# Patient Record
Sex: MALE | Race: WHITE | NOT HISPANIC OR LATINO | Employment: OTHER | URBAN - METROPOLITAN AREA
[De-identification: names, ages, dates, MRNs, and addresses within clinical notes are randomized per-mention and may not be internally consistent; named-entity substitution may affect disease eponyms.]

---

## 2017-07-26 ENCOUNTER — GENERIC CONVERSION - ENCOUNTER (OUTPATIENT)
Dept: OTHER | Facility: OTHER | Age: 77
End: 2017-07-26

## 2017-07-27 ENCOUNTER — ALLSCRIPTS OFFICE VISIT (OUTPATIENT)
Dept: OTHER | Facility: OTHER | Age: 77
End: 2017-07-27

## 2017-07-27 LAB
BILIRUB UR QL STRIP: NEGATIVE
CLARITY UR: NORMAL
COLOR UR: YELLOW
GLUCOSE (HISTORICAL): NEGATIVE
HGB UR QL STRIP.AUTO: NEGATIVE
KETONES UR STRIP-MCNC: NEGATIVE MG/DL
LEUKOCYTE ESTERASE UR QL STRIP: NEGATIVE
NITRITE UR QL STRIP: NEGATIVE
PH UR STRIP.AUTO: 7 [PH]
PROT UR STRIP-MCNC: NEGATIVE MG/DL
SP GR UR STRIP.AUTO: 1.01
UROBILINOGEN UR QL STRIP.AUTO: 0.2

## 2017-08-17 ENCOUNTER — ALLSCRIPTS OFFICE VISIT (OUTPATIENT)
Dept: OTHER | Facility: OTHER | Age: 77
End: 2017-08-17

## 2017-08-17 LAB
BILIRUB UR QL STRIP: NORMAL
CLARITY UR: NORMAL
COLOR UR: NORMAL
GLUCOSE (HISTORICAL): NORMAL
HGB UR QL STRIP.AUTO: NORMAL
KETONES UR STRIP-MCNC: NORMAL MG/DL
LEUKOCYTE ESTERASE UR QL STRIP: NORMAL
NITRITE UR QL STRIP: NORMAL
PH UR STRIP.AUTO: 6.5 [PH]
PROT UR STRIP-MCNC: NORMAL MG/DL
SP GR UR STRIP.AUTO: 1.02
UROBILINOGEN UR QL STRIP.AUTO: 0.2

## 2017-08-22 ENCOUNTER — ALLSCRIPTS OFFICE VISIT (OUTPATIENT)
Dept: OTHER | Facility: OTHER | Age: 77
End: 2017-08-22

## 2017-08-22 LAB
BILIRUB UR QL STRIP: NEGATIVE
CLARITY UR: NORMAL
COLOR UR: YELLOW
GLUCOSE (HISTORICAL): NEGATIVE
HGB UR QL STRIP.AUTO: NORMAL
KETONES UR STRIP-MCNC: NEGATIVE MG/DL
LEUKOCYTE ESTERASE UR QL STRIP: NEGATIVE
NITRITE UR QL STRIP: NEGATIVE
PH UR STRIP.AUTO: 6.5 [PH]
PROT UR STRIP-MCNC: NEGATIVE MG/DL
SP GR UR STRIP.AUTO: 1.03
UROBILINOGEN UR QL STRIP.AUTO: 0.2

## 2018-01-09 NOTE — MISCELLANEOUS
Message   Recorded as Task   Date: 07/26/2017 01:01 PM, Created By: Haleigh Monk   Task Name: Miscellaneous   Assigned To: Dwight COYLE,TEAM   Regarding Patient: Jung Regalado, Status: Active   Comment:    Haleigh Monk - 26 Jul 2017 1:01 PM     TASK CREATED  Caller: Self; [phone] (Home)  Pt calling to make us aware he had his psa test done this morning at \[facility]\""  Emelyn Blackburn - 26 Jul 2017 1:53 PM     TASK EDITED  Noted          Signatures   Electronically signed by : Lupe Ridley RN; Jul 26 2017  1:53PM EST                       (Author)

## 2018-01-14 VITALS
WEIGHT: 220 LBS | BODY MASS INDEX: 29.8 KG/M2 | DIASTOLIC BLOOD PRESSURE: 76 MMHG | HEIGHT: 72 IN | SYSTOLIC BLOOD PRESSURE: 120 MMHG

## 2018-01-14 VITALS
SYSTOLIC BLOOD PRESSURE: 130 MMHG | BODY MASS INDEX: 29.39 KG/M2 | DIASTOLIC BLOOD PRESSURE: 76 MMHG | WEIGHT: 217 LBS | HEIGHT: 72 IN

## 2018-01-22 VITALS
WEIGHT: 223 LBS | HEIGHT: 72 IN | DIASTOLIC BLOOD PRESSURE: 60 MMHG | SYSTOLIC BLOOD PRESSURE: 100 MMHG | BODY MASS INDEX: 30.2 KG/M2

## 2018-07-27 ENCOUNTER — APPOINTMENT (OUTPATIENT)
Dept: LAB | Age: 78
End: 2018-07-27
Payer: MEDICARE

## 2018-07-27 DIAGNOSIS — Z12.5 ENCOUNTER FOR SCREENING FOR MALIGNANT NEOPLASM OF PROSTATE: ICD-10-CM

## 2018-07-27 LAB — PSA SERPL-MCNC: 0.8 NG/ML (ref 0–4)

## 2018-07-27 PROCEDURE — G0103 PSA SCREENING: HCPCS

## 2018-07-27 PROCEDURE — 36415 COLL VENOUS BLD VENIPUNCTURE: CPT

## 2018-07-27 RX ORDER — KETOCONAZOLE 20 MG/G
CREAM TOPICAL
Refills: 1 | COMMUNITY
Start: 2018-05-12

## 2018-07-27 RX ORDER — TAMSULOSIN HYDROCHLORIDE 0.4 MG/1
0.4 CAPSULE ORAL
Refills: 11 | COMMUNITY
Start: 2018-04-30 | End: 2018-07-31 | Stop reason: SDUPTHER

## 2018-07-27 RX ORDER — MOMETASONE FUROATE 1 MG/ML
SOLUTION TOPICAL
Refills: 3 | COMMUNITY
Start: 2018-05-14

## 2018-07-27 RX ORDER — NAPROXEN SODIUM 220 MG
220 TABLET ORAL
COMMUNITY

## 2018-07-31 ENCOUNTER — OFFICE VISIT (OUTPATIENT)
Dept: UROLOGY | Facility: MEDICAL CENTER | Age: 78
End: 2018-07-31
Payer: MEDICARE

## 2018-07-31 VITALS
BODY MASS INDEX: 29.4 KG/M2 | DIASTOLIC BLOOD PRESSURE: 60 MMHG | WEIGHT: 210 LBS | SYSTOLIC BLOOD PRESSURE: 110 MMHG | HEIGHT: 71 IN

## 2018-07-31 DIAGNOSIS — N13.8 BPH WITH OBSTRUCTION/LOWER URINARY TRACT SYMPTOMS: ICD-10-CM

## 2018-07-31 DIAGNOSIS — R35.1 NOCTURIA: Primary | ICD-10-CM

## 2018-07-31 DIAGNOSIS — N40.1 BPH WITH OBSTRUCTION/LOWER URINARY TRACT SYMPTOMS: ICD-10-CM

## 2018-07-31 DIAGNOSIS — Z12.5 ENCOUNTER FOR PROSTATE CANCER SCREENING: ICD-10-CM

## 2018-07-31 LAB — POST-VOID RESIDUAL VOLUME, ML POC: 56 ML

## 2018-07-31 PROCEDURE — 51798 US URINE CAPACITY MEASURE: CPT | Performed by: UROLOGY

## 2018-07-31 PROCEDURE — 99214 OFFICE O/P EST MOD 30 MIN: CPT | Performed by: UROLOGY

## 2018-07-31 RX ORDER — FINASTERIDE 1 MG/1
1 TABLET, FILM COATED ORAL
COMMUNITY
End: 2018-07-31 | Stop reason: CLARIF

## 2018-07-31 RX ORDER — TAMSULOSIN HYDROCHLORIDE 0.4 MG/1
0.4 CAPSULE ORAL
Qty: 90 CAPSULE | Refills: 3 | Status: SHIPPED | OUTPATIENT
Start: 2018-07-31 | End: 2019-08-06 | Stop reason: SDUPTHER

## 2018-07-31 NOTE — PROGRESS NOTES
[address] [facility]  [facility]  [address] [facility]  [place], [address] [facility]  [phone]  www  [facility]  org      NAME: Arabella Hillman  AGE: [de-identified] y o  SEX: male  : 1940   MRN: [id number]    DATE: 2018  TIME: 8:51 AM    Assessment and Plan:  BPH with obstruction:  He is actually doing quite well from my standpoint  He can continue to take the Flomax as needed  He is on no other medications from me  See me in 1 year for PSA and general recheck  Chief Complaint     Chief Complaint   Patient presents with    Benign Prostatic Hypertrophy     1 yr ck       History of Present Illness     Established patient with a history of BPH with obstruction, incomplete bladder emptying and nocturia and erectile dysfunction- stopped taking proscar 2 years ago- was having dry skin and read about some other sx, he decided to stop it  Diet remains a major factor in LUTS- coffee and chocolate  remains on flomax, which he takes only as needed if he feels that he has a problem  Cisneros Spruce PSA down to 0 76 last year, now 0 8  Cisneros Spruce proscar actually seemed to increase his nocturia to 4x/night, so it was stopped  I   In the past, I  placed him on imipramine and it had a paradoxical effect in terms of weakened stream, more urgency and frequency  Bladder scan 56 cc this morning  The following portions of the patient's history were reviewed and updated as appropriate: allergies, current medications, past family history, past medical history, past social history, past surgical history and problem list     Review of Systems   Review of Systems   Genitourinary: Negative  Active Problem List   There is no problem list on file for this patient  Objective   /60   Ht 5' 11" (1 803 m)   Wt 95 3 kg (210 lb)   BMI 29 29 kg/m²     Physical Exam   Constitutional: He is oriented to person, place, and time  He appears well-developed and well-nourished     HENT:   Head: Normocephalic and atraumatic  Eyes: EOM are normal    Neck: Normal range of motion  Pulmonary/Chest: Effort normal    Genitourinary: Rectum normal and prostate normal    Genitourinary Comments: Rectal exam reveals normal tone no masses and his prostate is about 30 g, smooth symmetric and benign  Musculoskeletal: Normal range of motion  Neurological: He is alert and oriented to person, place, and time  Skin: Skin is warm and dry  Psychiatric: He has a normal mood and affect   His behavior is normal  Judgment and thought content normal            Current Medications     Current Outpatient Prescriptions:     finasteride (PROPECIA) 1 MG tablet, Take 1 mg by mouth, Disp: , Rfl:     ketoconazole (NIZORAL) 2 % cream, APPLY TO AFFECTED AREA(S) TWO TIMES DAILY, Disp: , Rfl: 1    mometasone (ELOCON) 0 1 % lotion, APPLY TO SCALP DAILY AS DIRECTED, Disp: , Rfl: 3    tamsulosin (FLOMAX) 0 4 mg, Take 0 4 mg by mouth daily at bedtime, Disp: , Rfl: 11    naproxen sodium (ALEVE) 220 MG tablet, Take 220 mg by mouth, Disp: , Rfl:         Tony Farah MD

## 2018-07-31 NOTE — LETTER
2018     [address] [address]    Patient: Ratna Deleon   YOB: 1940   Date of Visit: 2018       Dear Dr Mack Prieto: Thank you for referring Vic Mullen to me for evaluation  Below are my notes for this consultation  If you have questions, please do not hesitate to call me  I look forward to following your patient along with you  Sincerely,        Rob Bergeron MD        CC: No Recipients  Rob Bergeron MD  2018  9:06 AM  Sign at close encounter  [address] [facility]  [address], [address] [place] [phone]  www  [facility]  org      NAME: Ratna Deleon  AGE: [de-identified] y o  SEX: male  : 1940   MRN: [id number]    DATE: 2018  TIME: 8:51 AM    Assessment and Plan:  BPH with obstruction:  He is actually doing quite well from my standpoint  He can continue to take the Flomax as needed  He is on no other medications from me  See me in 1 year for PSA and general recheck  Chief Complaint     Chief Complaint   Patient presents with    Benign Prostatic Hypertrophy     1 yr ck       History of Present Illness     Established patient with a history of BPH with obstruction, incomplete bladder emptying and nocturia and erectile dysfunction- stopped taking proscar 2 years ago- was having dry skin and read about some other sx, he decided to stop it  Diet remains a major factor in LUTS- coffee and chocolate  remains on flomax, which he takes only as needed if he feels that he has a problem  Dania Hodgson PSA down to 0 76 last year, now 0 8  Dania Hodgson proscar actually seemed to increase his nocturia to 4x/night, so it was stopped  I   In the past, I  placed him on imipramine and it had a paradoxical effect in terms of weakened stream, more urgency and frequency  Bladder scan 56 cc this morning            The following portions of the patient's history were reviewed and updated as appropriate: allergies, current medications, past family history, past medical history, past social history, past surgical history and problem list     Review of Systems   Review of Systems   Genitourinary: Negative  Active Problem List   There is no problem list on file for this patient  Objective   /60   Ht 5' 11" (1 803 m)   Wt 95 3 kg (210 lb)   BMI 29 29 kg/m²      Physical Exam   Constitutional: He is oriented to person, place, and time  He appears well-developed and well-nourished  HENT:   Head: Normocephalic and atraumatic  Eyes: EOM are normal    Neck: Normal range of motion  Pulmonary/Chest: Effort normal    Genitourinary: Rectum normal and prostate normal    Genitourinary Comments: Rectal exam reveals normal tone no masses and his prostate is about 30 g, smooth symmetric and benign  Musculoskeletal: Normal range of motion  Neurological: He is alert and oriented to person, place, and time  Skin: Skin is warm and dry  Psychiatric: He has a normal mood and affect   His behavior is normal  Judgment and thought content normal            Current Medications     Current Outpatient Prescriptions:     finasteride (PROPECIA) 1 MG tablet, Take 1 mg by mouth, Disp: , Rfl:     ketoconazole (NIZORAL) 2 % cream, APPLY TO AFFECTED AREA(S) TWO TIMES DAILY, Disp: , Rfl: 1    mometasone (ELOCON) 0 1 % lotion, APPLY TO SCALP DAILY AS DIRECTED, Disp: , Rfl: 3    tamsulosin (FLOMAX) 0 4 mg, Take 0 4 mg by mouth daily at bedtime, Disp: , Rfl: 11    naproxen sodium (ALEVE) 220 MG tablet, Take 220 mg by mouth, Disp: , Rfl:         Jerson Farias MD

## 2019-07-30 ENCOUNTER — APPOINTMENT (OUTPATIENT)
Dept: LAB | Age: 79
End: 2019-07-30
Payer: MEDICARE

## 2019-07-30 DIAGNOSIS — Z12.5 ENCOUNTER FOR PROSTATE CANCER SCREENING: ICD-10-CM

## 2019-07-30 LAB — PSA SERPL-MCNC: 1.4 NG/ML (ref 0–4)

## 2019-07-30 PROCEDURE — G0103 PSA SCREENING: HCPCS

## 2019-07-30 PROCEDURE — 36415 COLL VENOUS BLD VENIPUNCTURE: CPT

## 2019-07-31 RX ORDER — CELECOXIB 100 MG/1
100 CAPSULE ORAL 2 TIMES DAILY
Refills: 2 | COMMUNITY
Start: 2019-05-14

## 2019-08-06 ENCOUNTER — OFFICE VISIT (OUTPATIENT)
Dept: UROLOGY | Facility: MEDICAL CENTER | Age: 79
End: 2019-08-06
Payer: MEDICARE

## 2019-08-06 VITALS
HEIGHT: 71 IN | HEART RATE: 60 BPM | WEIGHT: 218 LBS | SYSTOLIC BLOOD PRESSURE: 120 MMHG | DIASTOLIC BLOOD PRESSURE: 70 MMHG | BODY MASS INDEX: 30.52 KG/M2

## 2019-08-06 DIAGNOSIS — R35.1 NOCTURIA: ICD-10-CM

## 2019-08-06 DIAGNOSIS — Z12.5 ENCOUNTER FOR PROSTATE CANCER SCREENING: Primary | ICD-10-CM

## 2019-08-06 PROCEDURE — 99214 OFFICE O/P EST MOD 30 MIN: CPT | Performed by: UROLOGY

## 2019-08-06 RX ORDER — TAMSULOSIN HYDROCHLORIDE 0.4 MG/1
0.4 CAPSULE ORAL
Qty: 90 CAPSULE | Refills: 3 | Status: SHIPPED | OUTPATIENT
Start: 2019-08-06 | End: 2021-08-05

## 2019-08-06 NOTE — PROGRESS NOTES
[address] [facility]  [facility]  [address] [facility]  [place], [address] [facility]  [phone]  www  [facility]  org      NAME: Rosa Tim  AGE: 78 y o  SEX: male  : 1940   MRN: [id number]    DATE: 2019  TIME: 2:14 PM    Assessment and Plan:    BPH with obstruction, with an increase in his nocturia-this may be related to his iced tea consumption at dinnertime and/ or his recent hip surgery  It also may be the Proscar slowly wearing off  At this time he is going to stop drinking iced tea at dinnertime and see how that goes  If that does not work, he will take his Flomax every night before bedtime  If that should fail, we can increase his Flomax to 0 8 mg upon his request   Refilled Flomax  Follow-up 1 year with PSA  Chief Complaint   No chief complaint on file  History of Present Illness   Yearly follow-up for BPH with obstruction  Also encounter for prostate cancer screening  Remains on Flomax but he does not take this consistently, stopped Proscar 3 years ago  Has history of nocturia and I had once placed him on imipramine and had a paradoxical effect in terms of weakening stream and giving him more urgency and frequency  PSA is 1 4 this year as of 2019  It was 0 8 last year  This may be wearing off of the Proscar  He has had an increase in his nocturia for the last month or 2    He is now getting up 3-4 times per night  He drinks iced tea at dinnertime  He had hip surgery-hip replacement about a month and half ago and he has not feel this affected his voiding at all  Stream is good        The following portions of the patient's history were reviewed and updated as appropriate: allergies, current medications, past family history, past medical history, past social history, past surgical history and problem list   Past Medical History:   Diagnosis Date    Acute prostatitis     Arthritis     BPH with obstruction/lower urinary tract symptoms     Cervicalgia     Dysuria     Erectile dysfunction     Feeling of incomplete bladder emptying     Frequency of urination     Nocturia     Urinary retention      Past Surgical History:   Procedure Laterality Date    HERNIA REPAIR  1998    HIP SURGERY  06/2019     shoulder  Review of Systems   Review of Systems   Gastrointestinal: Negative  Genitourinary: Positive for frequency  Negative for difficulty urinating  Musculoskeletal: Positive for arthralgias  Active Problem List   There is no problem list on file for this patient  Objective   /70   Pulse 60   Ht 5' 11" (1 803 m)   Wt 98 9 kg (218 lb)   BMI 30 40 kg/m²     Physical Exam   Constitutional: He is oriented to person, place, and time  He appears well-developed and well-nourished  HENT:   Head: Normocephalic and atraumatic  Eyes: EOM are normal    Neck: Normal range of motion  Cardiovascular: Normal rate  Pulmonary/Chest: Effort normal    Musculoskeletal: Normal range of motion  Neurological: He is alert and oriented to person, place, and time  Skin: Skin is warm and dry  Psychiatric: He has a normal mood and affect   His behavior is normal  Judgment and thought content normal            Current Medications     Current Outpatient Medications:     naproxen sodium (ALEVE) 220 MG tablet, Take 220 mg by mouth, Disp: , Rfl:     tamsulosin (FLOMAX) 0 4 mg, Take 1 capsule (0 4 mg total) by mouth daily at bedtime (Patient taking differently: Take 0 4 mg by mouth daily at bedtime ), Disp: 90 capsule, Rfl: 3    celecoxib (CeleBREX) 100 mg capsule, Take 100 mg by mouth 2 (two) times a day, Disp: , Rfl: 2    ketoconazole (NIZORAL) 2 % cream, APPLY TO AFFECTED AREA(S) TWO TIMES DAILY, Disp: , Rfl: 1    mometasone (ELOCON) 0 1 % lotion, APPLY TO SCALP DAILY AS DIRECTED, Disp: , Rfl: 3        Tony Farah MD

## 2019-08-06 NOTE — PATIENT INSTRUCTIONS
Try stopping the iced tea at dinnertime  If that does not make things better, take the tamsulosin every night before bedtime  You can also take 2 capsules of tamsulosin at bedtime if taking 1 capsule does not work

## 2021-08-05 ENCOUNTER — OFFICE VISIT (OUTPATIENT)
Dept: UROLOGY | Facility: MEDICAL CENTER | Age: 81
End: 2021-08-05
Payer: MEDICARE

## 2021-08-05 VITALS
DIASTOLIC BLOOD PRESSURE: 72 MMHG | BODY MASS INDEX: 26.6 KG/M2 | HEART RATE: 43 BPM | HEIGHT: 71 IN | SYSTOLIC BLOOD PRESSURE: 128 MMHG | WEIGHT: 190 LBS

## 2021-08-05 DIAGNOSIS — N13.8 BPH WITH OBSTRUCTION/LOWER URINARY TRACT SYMPTOMS: ICD-10-CM

## 2021-08-05 DIAGNOSIS — R35.0 URINARY FREQUENCY: Primary | ICD-10-CM

## 2021-08-05 DIAGNOSIS — R35.1 NOCTURIA: ICD-10-CM

## 2021-08-05 DIAGNOSIS — N40.0 BPH WITH ELEVATED PSA: ICD-10-CM

## 2021-08-05 DIAGNOSIS — R97.20 BPH WITH ELEVATED PSA: ICD-10-CM

## 2021-08-05 DIAGNOSIS — N40.1 BPH WITH OBSTRUCTION/LOWER URINARY TRACT SYMPTOMS: ICD-10-CM

## 2021-08-05 LAB
SL AMB  POCT GLUCOSE, UA: NORMAL
SL AMB LEUKOCYTE ESTERASE,UA: NORMAL
SL AMB POCT BILIRUBIN,UA: NORMAL
SL AMB POCT BLOOD,UA: NORMAL
SL AMB POCT CLARITY,UA: CLEAR
SL AMB POCT COLOR,UA: YELLOW
SL AMB POCT KETONES,UA: NORMAL
SL AMB POCT NITRITE,UA: NORMAL
SL AMB POCT PH,UA: 7
SL AMB POCT SPECIFIC GRAVITY,UA: 1.02
SL AMB POCT URINE PROTEIN: NORMAL
SL AMB POCT UROBILINOGEN: 0.2

## 2021-08-05 PROCEDURE — 81003 URINALYSIS AUTO W/O SCOPE: CPT | Performed by: UROLOGY

## 2021-08-05 PROCEDURE — 99214 OFFICE O/P EST MOD 30 MIN: CPT | Performed by: UROLOGY

## 2021-08-05 RX ORDER — CHOLECALCIFEROL (VITAMIN D3) 125 MCG
500 CAPSULE ORAL DAILY
COMMUNITY

## 2021-08-05 RX ORDER — DONEPEZIL HYDROCHLORIDE 5 MG/1
5 TABLET, FILM COATED ORAL EVERY EVENING
COMMUNITY
Start: 2021-04-29

## 2021-08-05 NOTE — ASSESSMENT & PLAN NOTE
This is the patient's primary complaint  Although he has BPH on physical exam, his symptoms could well be due to bladder malfunction  Cystoscopy scheduled

## 2021-08-05 NOTE — ASSESSMENT & PLAN NOTE
Some aspects of the patient's symptom complex was seem to be due to bladder malfunction rather than BPH  Cystoscopy scheduled

## 2021-08-05 NOTE — PROGRESS NOTES
Assessment/Plan:    Urinary frequency  This is the patient's primary complaint  Although he has BPH on physical exam, his symptoms could well be due to bladder malfunction  Cystoscopy scheduled  BPH with obstruction/lower urinary tract symptoms  Some aspects of the patient's symptom complex was seem to be due to bladder malfunction rather than BPH  Cystoscopy scheduled  Diagnoses and all orders for this visit:    Urinary frequency    Nocturia  -     POCT urine dip auto non-scope    BPH with elevated PSA    BPH with obstruction/lower urinary tract symptoms    Other orders  -     donepezil (ARICEPT) 5 mg tablet; Take 5 mg by mouth every evening  -     Cholecalciferol (Vitamin D3) 50 MCG (2000 UT) CHEW; Chew  -     vitamin B-12 (VITAMIN B-12) 500 mcg tablet; Take 500 mcg by mouth daily          Subjective:      Patient ID: Rosa Tim is a 78 y o  male  HPI  BPH:  He notes weak stream and nocturia x 2  He denies other significant urinary symptoms  He denies gross hematuria, urinary tract infections or incontinence  He is taking neither medications not supplements for his symptoms  Pt has frequency hourly  Pt has to  the BR in any new location  Does not carry a urinal in the car  Sx for a long time  Pt and wife cannot say how long  Tamsulosin made him void more often and he stopped it  PVR:  104 cc today, 56 cc on July 31, 2018  Never had a cysto  PSA:  No longer monitored due to the patient's age  0   Lab Value Date/Time    PSA 1 4 07/30/2019 1138    PSA 0 8 07/27/2018 1315   ]    AUA SYMPTOM SCORE      Most Recent Value   AUA SYMPTOM SCORE   How often have you had a sensation of not emptying your bladder completely after you finished urinating? 1   How often have you had to urinate again less than two hours after you finished urinating?   1   How often have you found you stopped and started again several times when you urinate?  0   How often have you found it difficult to postpone urination? 2   How often have you had a weak urinary stream?  2   How often have you had to push or strain to begin urination? 1   How many times did you most typically get up to urinate from the time you went to bed at night until the time you got up in the morning? 2   Quality of Life: If you were to spend the rest of your life with your urinary condition just the way it is now, how would you feel about that?  4   AUA SYMPTOM SCORE  9          The following portions of the patient's history were reviewed and updated as appropriate: allergies, current medications, past family history, past medical history, past social history, past surgical history and problem list     Review of Systems   Constitutional: Negative for activity change and fatigue  Respiratory: Negative for shortness of breath and wheezing  Cardiovascular: Negative for chest pain  Gastrointestinal: Negative for abdominal pain  Genitourinary: Negative for difficulty urinating, dysuria, frequency, hematuria and urgency  Musculoskeletal: Negative for back pain and gait problem  Skin: Negative  Allergic/Immunologic: Negative  Neurological: Negative  Declining mental status on donepezil  Note the patient has a PhD degree in psychology  Psychiatric/Behavioral: Negative  Objective:      /72   Pulse (!) 43   Ht 5' 11" (1 803 m)   Wt 86 2 kg (190 lb)   BMI 26 50 kg/m²          Physical Exam  Constitutional:       Appearance: He is well-developed  HENT:      Head: Normocephalic and atraumatic  Pulmonary:      Effort: Pulmonary effort is normal    Genitourinary:     Rectum: Normal       Comments: The prostate is 40 grams, firm, smooth and non-tender  Musculoskeletal:         General: Normal range of motion  Cervical back: Normal range of motion and neck supple  Skin:     General: Skin is warm and dry     Neurological:      Mental Status: He is alert and oriented to person, place, and time  Psychiatric:         Behavior: Behavior normal          Thought Content:  Thought content normal          Judgment: Judgment normal

## 2021-10-26 ENCOUNTER — PROCEDURE VISIT (OUTPATIENT)
Dept: UROLOGY | Facility: MEDICAL CENTER | Age: 81
End: 2021-10-26
Payer: MEDICARE

## 2021-10-26 VITALS
WEIGHT: 190 LBS | HEIGHT: 71 IN | DIASTOLIC BLOOD PRESSURE: 80 MMHG | BODY MASS INDEX: 26.6 KG/M2 | HEART RATE: 67 BPM | SYSTOLIC BLOOD PRESSURE: 124 MMHG

## 2021-10-26 DIAGNOSIS — Z71.89 OTHER SPECIFIED COUNSELING: ICD-10-CM

## 2021-10-26 DIAGNOSIS — N40.1 BPH WITH OBSTRUCTION/LOWER URINARY TRACT SYMPTOMS: Primary | ICD-10-CM

## 2021-10-26 DIAGNOSIS — N13.8 BPH WITH OBSTRUCTION/LOWER URINARY TRACT SYMPTOMS: Primary | ICD-10-CM

## 2021-10-26 PROCEDURE — 99213 OFFICE O/P EST LOW 20 MIN: CPT | Performed by: UROLOGY

## 2021-10-26 PROCEDURE — 52000 CYSTOURETHROSCOPY: CPT | Performed by: UROLOGY

## 2021-10-26 RX ORDER — ALFUZOSIN HYDROCHLORIDE 10 MG/1
10 TABLET, EXTENDED RELEASE ORAL DAILY
Qty: 30 TABLET | Refills: 3 | Status: SHIPPED | OUTPATIENT
Start: 2021-10-26 | End: 2022-02-24 | Stop reason: SDUPTHER

## 2021-10-26 RX ORDER — CLOTRIMAZOLE 1 %
CREAM (GRAM) TOPICAL
COMMUNITY

## 2022-02-24 DIAGNOSIS — N40.1 BPH WITH OBSTRUCTION/LOWER URINARY TRACT SYMPTOMS: ICD-10-CM

## 2022-02-24 DIAGNOSIS — N13.8 BPH WITH OBSTRUCTION/LOWER URINARY TRACT SYMPTOMS: ICD-10-CM

## 2022-02-24 RX ORDER — ALFUZOSIN HYDROCHLORIDE 10 MG/1
10 TABLET, EXTENDED RELEASE ORAL DAILY
Qty: 30 TABLET | Refills: 0 | Status: SHIPPED | OUTPATIENT
Start: 2022-02-24 | End: 2022-03-24

## 2022-03-24 DIAGNOSIS — N40.1 BPH WITH OBSTRUCTION/LOWER URINARY TRACT SYMPTOMS: ICD-10-CM

## 2022-03-24 DIAGNOSIS — N13.8 BPH WITH OBSTRUCTION/LOWER URINARY TRACT SYMPTOMS: ICD-10-CM

## 2022-03-24 RX ORDER — ALFUZOSIN HYDROCHLORIDE 10 MG/1
TABLET, EXTENDED RELEASE ORAL
Qty: 30 TABLET | Refills: 0 | Status: SHIPPED | OUTPATIENT
Start: 2022-03-24 | End: 2022-03-25

## 2022-03-25 RX ORDER — ALFUZOSIN HYDROCHLORIDE 10 MG/1
TABLET, EXTENDED RELEASE ORAL
Qty: 30 TABLET | Refills: 0 | Status: SHIPPED | OUTPATIENT
Start: 2022-03-25 | End: 2022-04-25

## 2022-04-23 DIAGNOSIS — N40.1 BPH WITH OBSTRUCTION/LOWER URINARY TRACT SYMPTOMS: ICD-10-CM

## 2022-04-23 DIAGNOSIS — N13.8 BPH WITH OBSTRUCTION/LOWER URINARY TRACT SYMPTOMS: ICD-10-CM

## 2022-04-25 RX ORDER — ALFUZOSIN HYDROCHLORIDE 10 MG/1
TABLET, EXTENDED RELEASE ORAL
Qty: 30 TABLET | Refills: 0 | Status: SHIPPED | OUTPATIENT
Start: 2022-04-25 | End: 2022-05-26

## 2022-05-26 DIAGNOSIS — N13.8 BPH WITH OBSTRUCTION/LOWER URINARY TRACT SYMPTOMS: ICD-10-CM

## 2022-05-26 DIAGNOSIS — N40.1 BPH WITH OBSTRUCTION/LOWER URINARY TRACT SYMPTOMS: ICD-10-CM

## 2022-05-26 RX ORDER — ALFUZOSIN HYDROCHLORIDE 10 MG/1
TABLET, EXTENDED RELEASE ORAL
Qty: 30 TABLET | Refills: 0 | Status: SHIPPED | OUTPATIENT
Start: 2022-05-26 | End: 2022-07-29 | Stop reason: SDUPTHER

## 2022-07-29 DIAGNOSIS — N13.8 BPH WITH OBSTRUCTION/LOWER URINARY TRACT SYMPTOMS: ICD-10-CM

## 2022-07-29 DIAGNOSIS — N40.1 BPH WITH OBSTRUCTION/LOWER URINARY TRACT SYMPTOMS: ICD-10-CM

## 2022-07-29 RX ORDER — ALFUZOSIN HYDROCHLORIDE 10 MG/1
10 TABLET, EXTENDED RELEASE ORAL DAILY
Qty: 90 TABLET | Refills: 1 | Status: SHIPPED | OUTPATIENT
Start: 2022-07-29

## 2022-07-29 NOTE — TELEPHONE ENCOUNTER
The patient has an upcoming office visit scheduled for 10/14/22 with Dr Natalia Philip in the [facility] location but will run out of medication until then    Request for same, 90 day supply with 1 refill was queued and forwarded to the Advanced Practitioner covering the [facility] location for approval

## 2022-07-29 NOTE — TELEPHONE ENCOUNTER
Patient is calling to request a prescription refill    Medication: Alfuzosin 10 mg    30 / 90 day supply: 30 day    Pharmacy: [facility]    Patient's wife requesting a call back when the medication is refilled at [phone]

## 2022-10-14 ENCOUNTER — OFFICE VISIT (OUTPATIENT)
Dept: UROLOGY | Facility: MEDICAL CENTER | Age: 82
End: 2022-10-14
Payer: MEDICARE

## 2022-10-14 ENCOUNTER — TELEPHONE (OUTPATIENT)
Dept: UROLOGY | Facility: AMBULATORY SURGERY CENTER | Age: 82
End: 2022-10-14

## 2022-10-14 VITALS
OXYGEN SATURATION: 99 % | DIASTOLIC BLOOD PRESSURE: 78 MMHG | BODY MASS INDEX: 26.5 KG/M2 | HEIGHT: 71 IN | SYSTOLIC BLOOD PRESSURE: 132 MMHG | HEART RATE: 71 BPM

## 2022-10-14 DIAGNOSIS — N40.1 BPH WITH OBSTRUCTION/LOWER URINARY TRACT SYMPTOMS: Primary | ICD-10-CM

## 2022-10-14 DIAGNOSIS — N39.41 URGE INCONTINENCE OF URINE: ICD-10-CM

## 2022-10-14 DIAGNOSIS — N13.8 BPH WITH OBSTRUCTION/LOWER URINARY TRACT SYMPTOMS: Primary | ICD-10-CM

## 2022-10-14 DIAGNOSIS — R41.89 COGNITIVE DECLINE: ICD-10-CM

## 2022-10-14 LAB — POST-VOID RESIDUAL VOLUME, ML POC: 14 ML

## 2022-10-14 PROCEDURE — 99214 OFFICE O/P EST MOD 30 MIN: CPT | Performed by: UROLOGY

## 2022-10-14 PROCEDURE — 51798 US URINE CAPACITY MEASURE: CPT | Performed by: UROLOGY

## 2022-10-14 RX ORDER — TROSPIUM CHLORIDE ER 60 MG/1
60 CAPSULE ORAL
Qty: 90 CAPSULE | Refills: 1 | Status: SHIPPED | OUTPATIENT
Start: 2022-10-14

## 2022-10-14 NOTE — TELEPHONE ENCOUNTER
I spoke with patients daughter and let her know the urine we got in the office was not enough to be sent out for a UA  Patient is in a nursing facility and I did let her know if they can do this for him we can fax the order over  She is going to see if they will and call back with fax#

## 2022-10-14 NOTE — PROGRESS NOTES
Assessment/Plan:    BPH with obstruction/lower urinary tract symptoms  The patient has documented prostatic obstruction  He notes he is voiding with an adequate stream and his bladder emptying is good on postvoid residual   We will obtain a urinalysis  He will continue alfuzosin  Urge incontinence of urine  Likely related to overactive bladder and cognitive decline as well as functional with decreased mobility  Options were discussed  We will try Sanctura XR to see if this improves his voiding pattern  Use and side effects were discussed  Diagnoses and all orders for this visit:    BPH with obstruction/lower urinary tract symptoms  -     POCT Measure PVR    Urge incontinence of urine  -     trospium (SANCTURA XR) 60 mg 24 hr capsule; Take 1 capsule (60 mg total) by mouth daily before breakfast  -     Urinalysis with microscopic; Future    Cognitive decline          Subjective:      Patient ID: Shante Parker is a 78 y o  male  Benign Prostatic Hypertrophy  This is a chronic problem  The current episode started more than 1 year ago  The problem has been gradually worsening since onset  Irritative symptoms include frequency, nocturia and urgency  Obstructive symptoms do not include dribbling, incomplete emptying, an intermittent stream, a slower stream, straining or a weak stream  Pertinent negatives include no chills, dysuria or hematuria  His sexual activity is non-contributory to the current illness  Nothing aggravates the symptoms  Past treatments include alfuzosin  Since his last visit the patient has been noted to have worsening urgency and urge incontinence  He gets up frequently at night  He is wearing depends  There is no gross hematuria or symptoms of infection        The following portions of the patient's history were reviewed and updated as appropriate: allergies, current medications, past family history, past medical history, past social history, past surgical history and problem list     Review of Systems   Constitutional: Negative  Negative for chills, diaphoresis, fatigue and fever  HENT: Negative  Eyes: Negative  Respiratory: Negative  Cardiovascular: Negative  Endocrine: Negative  Genitourinary: Positive for frequency, nocturia and urgency  Negative for dysuria, hematuria and incomplete emptying  See HPI   Musculoskeletal: Positive for back pain  Skin: Negative  Allergic/Immunologic: Negative  Neurological: Negative  Hematological: Negative  Psychiatric/Behavioral: Negative  Cognitive decline         Objective:      /78   Pulse 71   Ht 5' 11" (1 803 m)   SpO2 99%   BMI 26 50 kg/m²          Physical Exam  Vitals reviewed  Constitutional:       General: He is not in acute distress  Appearance: Normal appearance  He is well-developed and normal weight  He is not ill-appearing, toxic-appearing or diaphoretic  HENT:      Head: Normocephalic and atraumatic  Eyes:      General: No scleral icterus  Conjunctiva/sclera: Conjunctivae normal    Cardiovascular:      Rate and Rhythm: Normal rate  Pulmonary:      Effort: Pulmonary effort is normal    Abdominal:      General: Bowel sounds are normal  There is no distension  Palpations: Abdomen is soft  There is no mass  Tenderness: There is no abdominal tenderness  There is no right CVA tenderness, left CVA tenderness, guarding or rebound  Hernia: No hernia is present  Genitourinary:     Penis: Normal  No phimosis or hypospadias  Testes: Normal          Right: Mass not present  Left: Mass not present  Rectum: Normal       Comments: Prostate moderately enlarged and palpably benign  Musculoskeletal:         General: Normal range of motion  Cervical back: Neck supple  Skin:     General: Skin is warm and dry  Neurological:      General: No focal deficit present  Mental Status: He is alert and oriented to person, place, and time  Mental status is at baseline  Psychiatric:         Mood and Affect: Mood normal          Behavior: Behavior normal          Thought Content:  Thought content normal          Judgment: Judgment normal

## 2022-10-14 NOTE — TELEPHONE ENCOUNTER
Pt daughter called and stated pt had difficulty giving urine sample at [facility] and daughter is following up to see if nurse was able to get enough urine for testing in office   Pt daughter is also asking if urinalysis needs to be done in addition to testing that was done in office      Pt call [phone] Marta(daughter)

## 2022-10-14 NOTE — ASSESSMENT & PLAN NOTE
The patient has documented prostatic obstruction  He notes he is voiding with an adequate stream and his bladder emptying is good on postvoid residual   We will obtain a urinalysis  He will continue alfuzosin

## 2022-10-14 NOTE — ASSESSMENT & PLAN NOTE
Likely related to overactive bladder and cognitive decline as well as functional with decreased mobility  Options were discussed  We will try Sanctura XR to see if this improves his voiding pattern  Use and side effects were discussed

## 2022-12-21 DIAGNOSIS — N40.1 BPH WITH OBSTRUCTION/LOWER URINARY TRACT SYMPTOMS: ICD-10-CM

## 2022-12-21 DIAGNOSIS — N13.8 BPH WITH OBSTRUCTION/LOWER URINARY TRACT SYMPTOMS: ICD-10-CM

## 2022-12-21 RX ORDER — ALFUZOSIN HYDROCHLORIDE 10 MG/1
TABLET, EXTENDED RELEASE ORAL
Qty: 90 TABLET | Refills: 1 | Status: SHIPPED | OUTPATIENT
Start: 2022-12-21

## 2023-01-16 ENCOUNTER — TELEPHONE (OUTPATIENT)
Dept: UROLOGY | Facility: AMBULATORY SURGERY CENTER | Age: 83
End: 2023-01-16

## 2023-01-16 ENCOUNTER — OFFICE VISIT (OUTPATIENT)
Dept: UROLOGY | Facility: MEDICAL CENTER | Age: 83
End: 2023-01-16

## 2023-01-16 VITALS
DIASTOLIC BLOOD PRESSURE: 78 MMHG | HEART RATE: 87 BPM | OXYGEN SATURATION: 97 % | SYSTOLIC BLOOD PRESSURE: 118 MMHG | BODY MASS INDEX: 26.5 KG/M2 | HEIGHT: 71 IN

## 2023-01-16 DIAGNOSIS — Z12.5 PROSTATE CANCER SCREENING: ICD-10-CM

## 2023-01-16 DIAGNOSIS — N13.8 BPH WITH OBSTRUCTION/LOWER URINARY TRACT SYMPTOMS: Primary | ICD-10-CM

## 2023-01-16 DIAGNOSIS — E66.01 MORBID OBESITY DUE TO EXCESS CALORIES (HCC): ICD-10-CM

## 2023-01-16 DIAGNOSIS — N40.1 BPH WITH OBSTRUCTION/LOWER URINARY TRACT SYMPTOMS: Primary | ICD-10-CM

## 2023-01-16 LAB
POST-VOID RESIDUAL VOLUME, ML POC: 21 ML
SL AMB  POCT GLUCOSE, UA: NORMAL
SL AMB LEUKOCYTE ESTERASE,UA: NORMAL
SL AMB POCT BILIRUBIN,UA: NORMAL
SL AMB POCT BLOOD,UA: NORMAL
SL AMB POCT CLARITY,UA: CLEAR
SL AMB POCT COLOR,UA: YELLOW
SL AMB POCT KETONES,UA: NORMAL
SL AMB POCT NITRITE,UA: NORMAL
SL AMB POCT PH,UA: 7
SL AMB POCT SPECIFIC GRAVITY,UA: 1.02
SL AMB POCT URINE PROTEIN: NORMAL
SL AMB POCT UROBILINOGEN: 0.2

## 2023-01-16 NOTE — TELEPHONE ENCOUNTER
LVM that today's charges will be sent to insurance and any copay's/deductibles will be billed after claim processed

## 2023-01-16 NOTE — PROGRESS NOTES
1/16/2023      Chief Complaint   Patient presents with   • Benign Prostatic Hypertrophy         Assessment and Plan    78 y o  male managed by Dr Fausto Aguirre     1  BPH with obstruction/LUTS  · Evidence of obstruction on cystoscopy in 2021  · Managed on alfuzosin 10 mg po daily prn  · Refill as needed  2  OAB   · Minimal improvement on Sanctura XR  Offered Myrbetriq as alternative option  Discussed with his daughter, Mrs Delvin Acharya, who will be visiting soon and would like to observe her father on the medication before discontinuing or trying new medication  · Thorough discussion regarding bladder irritants  · Encouraged daily water intake to 40-60 oz  · Also discussed correlation between spinal stenosis as well as dementia with OAB symptoms  · PVR today: 21 mL   · Follow up in 6 months     3  Prostate cancer screening  · Previous PSAs: 1 4 (7/30/19), 0 8 (7/27/18)   · Thorough discussion regarding prostate cancer screening and AUA guidelines  Mrs Gomez requests PSA testing at this time  · PSA orders given to family  History of Present Illness  Roni Michaels is a 78 y o  male here for evaluation of BPH, OAB, and prostate cancer screening  Has a history of BPH with evidence of obstruction on office cystoscopy with Dr Jose Gutierrez in 2021  He has been managed on alfuzosin 10 mg p o  daily for many years  He was evaluated by Dr Fausto Aguirre in October 2022 for persistent frequency, urgency, urinary incontinence, and nocturia  He was started on Sanctura XR in attempt to improve suspected con commitment OAB symptoms  Patient is accompanied today by representative from his facility and his daughter was able to call in from [place] on the phone  Per reports from the facility, patient continues to have incontinence despite antispasmodic medication  His daughter did say he did better with her out in [place] but she was able to practice timed voiding and encouraged fluid intake during the day    It is difficult to police these things in a facility  She does have mild dementia and only states that he does not want to be wet at nighttime anymore  Review of Systems   Unable to perform ROS: Dementia     AUA SYMPTOM SCORE    Flowsheet Row Most Recent Value   AUA SYMPTOM SCORE    How often have you had a sensation of not emptying your bladder completely after you finished urinating? 3 (P)     How often have you had to urinate again less than two hours after you finished urinating? 4 (P)     How often have you found you stopped and started again several times when you urinate? 5 (P)     How often have you found it difficult to postpone urination? 5 (P)     How often have you had a weak urinary stream? 5 (P)     How often have you had to push or strain to begin urination? 3 (P)     How many times did you most typically get up to urinate from the time you went to bed at night until the time you got up in the morning? 5 (P)     Quality of Life: If you were to spend the rest of your life with your urinary condition just the way it is now, how would you feel about that? 5 (P)     AUA SYMPTOM SCORE 30 (P)            Vitals  Vitals:    01/16/23 1254   BP: 118/78   BP Location: Left arm   Patient Position: Sitting   Cuff Size: Adult   Pulse: 87   SpO2: 97%   Height: 5' 11" (1 803 m)       Physical Exam  Vitals reviewed  Constitutional:       General: He is not in acute distress  Appearance: Normal appearance  He is not ill-appearing, toxic-appearing or diaphoretic  HENT:      Head: Normocephalic and atraumatic  Eyes:      Conjunctiva/sclera: Conjunctivae normal    Pulmonary:      Effort: Pulmonary effort is normal  No respiratory distress  Abdominal:      General: There is no distension  Palpations: Abdomen is soft  Tenderness: There is no abdominal tenderness  There is no right CVA tenderness, left CVA tenderness, guarding or rebound  Musculoskeletal:         General: Normal range of motion  Cervical back: Normal range of motion  Skin:     General: Skin is warm and dry  Neurological:      Mental Status: He is alert  Mental status is at baseline  Psychiatric:         Mood and Affect: Mood normal          Behavior: Behavior normal        Past History  Past Medical History:   Diagnosis Date   • Acute prostatitis    • Arthritis    • BPH with obstruction/lower urinary tract symptoms    • Cervicalgia    • Dysuria    • Erectile dysfunction    • Feeling of incomplete bladder emptying    • Frequency of urination    • Nocturia    • Urinary retention      Social History     Socioeconomic History   • Marital status: /Civil Union     Spouse name: None   • Number of children: None   • Years of education: None   • Highest education level: None   Occupational History   • None   Tobacco Use   • Smoking status: Former     Types: Cigarettes     Quit date: 1972     Years since quittin 0   • Smokeless tobacco: Never   Vaping Use   • Vaping Use: Never used   Substance and Sexual Activity   • Alcohol use:  Yes   • Drug use: No   • Sexual activity: None   Other Topics Concern   • None   Social History Narrative   • None     Social Determinants of Health     Financial Resource Strain: Not on file   Food Insecurity: Not on file   Transportation Needs: Not on file   Physical Activity: Not on file   Stress: Not on file   Social Connections: Not on file   Intimate Partner Violence: Not on file   Housing Stability: Not on file     Social History     Tobacco Use   Smoking Status Former   • Types: Cigarettes   • Quit date: 1972   • Years since quittin 0   Smokeless Tobacco Never     Family History   Problem Relation Age of Onset   • Pneumonia Mother    • Heart attack Father        The following portions of the patient's history were reviewed and updated as appropriate: allergies, current medications, past medical history, past social history, past surgical history and problem list     Results  No results found for this or any previous visit (from the past 1 hour(s)) ]  Lab Results   Component Value Date    PSA 1 4 07/30/2019    PSA 0 8 07/27/2018     No results found for: GLUCOSE, CALCIUM, NA, K, CO2, CL, BUN, CREATININE  No results found for: WBC, HGB, HCT, MCV, PLT    Virgie Jimenez PA-C

## 2023-01-16 NOTE — TELEPHONE ENCOUNTER
Pt daughter called and not sure if pt aide will have pymt for pt upcoming appt and daughter is asking if she could take care of pymt for pt appt     Call [phone]

## 2023-01-18 ENCOUNTER — TELEPHONE (OUTPATIENT)
Dept: UROLOGY | Facility: AMBULATORY SURGERY CENTER | Age: 83
End: 2023-01-18

## 2023-01-18 DIAGNOSIS — N39.41 URGE INCONTINENCE OF URINE: ICD-10-CM

## 2023-01-18 RX ORDER — TROSPIUM CHLORIDE ER 60 MG/1
60 CAPSULE ORAL
Qty: 90 CAPSULE | Refills: 3 | Status: SHIPPED | OUTPATIENT
Start: 2023-01-18

## 2023-01-18 NOTE — TELEPHONE ENCOUNTER
----- Message from Evelio Salter RN sent at 1/18/2023  8:41 AM EST -----  Regarding: FW: Trospium and Thank You! Contact: [phone]    ----- Message -----  From: March Lane  Sent: 1/17/2023   8:30 PM EST  To: [facility]  Subject: Trospium and Thank You! Alyssa Day,  Thank you for your patience and thoughtful assessment of Dad's incontinence issues  Sometimes you meet someone who is truly meant to do the job they're performing and that is you! Smart, kind, willing to analyze a complex problem and offer solutions - explain health concerns with wit and without talking down to the patient  What a relief it was to have you taking care of Randy/Dad  THANK YOU! Can you please help and RE-write the Trospium for [place]  I had that on the portal but for some reason it defaulted to the OLD pharmacy/[facility] has cancelled/returned the prescription as there's no way to get it delivered to [facility]  New Trospium to [place] [address] [phone] for Markus Ho c/o [facility]      Thanks for your help,  Juan Díaz [phone] (Randy's daughter)

## 2023-01-27 DIAGNOSIS — Z12.5 PROSTATE CANCER SCREENING: Primary | ICD-10-CM

## 2023-01-30 ENCOUNTER — TELEPHONE (OUTPATIENT)
Dept: UROLOGY | Facility: MEDICAL CENTER | Age: 83
End: 2023-01-30

## 2023-01-30 NOTE — TELEPHONE ENCOUNTER
----- Message from Mague Vergara PA-C sent at 1/27/2023 11:53 AM EST -----  Psa stable at 0 9  Follow up in 1 year with PSA ,   New orders placed (not due until 1/26/23 to avoid extra charge from insurance)    Thanks   Unable to contact pt or his daughter

## 2023-07-24 ENCOUNTER — OFFICE VISIT (OUTPATIENT)
Dept: UROLOGY | Facility: MEDICAL CENTER | Age: 83
End: 2023-07-24

## 2023-07-24 VITALS — DIASTOLIC BLOOD PRESSURE: 82 MMHG | OXYGEN SATURATION: 97 % | HEART RATE: 82 BPM | SYSTOLIC BLOOD PRESSURE: 128 MMHG

## 2023-07-24 DIAGNOSIS — N40.1 BPH WITH OBSTRUCTION/LOWER URINARY TRACT SYMPTOMS: Primary | ICD-10-CM

## 2023-07-24 DIAGNOSIS — N13.8 BPH WITH OBSTRUCTION/LOWER URINARY TRACT SYMPTOMS: Primary | ICD-10-CM

## 2023-07-24 NOTE — PROGRESS NOTES
7/24/2023      Chief Complaint   Patient presents with   • Benign Prostatic Hypertrophy         Assessment and Plan    78 y.o. male    1. BPH with obstruction/LUTS  • Evidence of obstruction on cystoscopy in 2021  • Unable to provide urine sample today. Per family request, urine testing ordered to be performed at his facility. • Managed on alfuzosin 10 mg po daily prn.   ? Refill as needed.      2. OAB   • AUA score: 27   • Minimal improvement on Sanctura XR. Discussed with his daughter, Mrs. Pietro Jaffe, who would like to discontinue the medication at this time given lack of improvement. She will notify his facility to monitor for any increase in symptoms while coming off the medication. If he has a bothersome increase in symptoms, can certainly restart at any time. • Continue timed voiding   • Again reviewed bladder irritants and importance of drinking 40-60 oz water daily. • Also discussed correlation between spinal stenosis as well as dementia with OAB symptoms. • Low bladder residuals in past   • Follow up in 6 months     3. Prostate cancer screening   · PSA 0.9 in Jan 2023. Discussed with patient's daughter regarding AUA guidelines. She is agreeable to discontinue routine annual screening. Facility paperwork filled out and returned to facility representative accompanying patient today    History of Present Illness  Dorie Stanley is a 78 y.o. male here for evaluation of BPH, OAB, and prostate cancer screening. Has a history of BPH with evidence of obstruction on office cystoscopy with Dr. Randolph Isidro in 2021. He has been managed on alfuzosin 10 mg p.o. daily for many years. He was evaluated by Dr Delvin Kwan in October 2022 for persistent frequency, urgency, urinary incontinence, and nocturia. He was started on Sanctura XR in attempt to improve suspected con commitment OAB symptoms.   At his last visit, we had discussed possibly discontinuing trospium and retrying Myrbetriq but patient's daughter wished to monitor his symptoms for an additional 6 months before altering his medication regimen. She called in today as she usually does and states she is agreeable to discontinuing this medication at the time. She is very understanding of patient's comorbidities likely contributing to his overactive bladder and is not interested in trying a new medication at this time. She does explain that the facility has been very good with her father participating in timed voiding overnight. His PSA in January 2023 was stable at 0.9. Please see plan above. AUA SYMPTOM SCORE    Flowsheet Row Most Recent Value   AUA SYMPTOM SCORE    How often have you had a sensation of not emptying your bladder completely after you finished urinating? 2 (P)     How often have you had to urinate again less than two hours after you finished urinating? 3 (P)     How often have you found you stopped and started again several times when you urinate? 4 (P)     How often have you found it difficult to postpone urination? 5 (P)     How often have you had a weak urinary stream? 4 (P)     How often have you had to push or strain to begin urination? 4 (P)     How many times did you most typically get up to urinate from the time you went to bed at night until the time you got up in the morning? 5 (P)     Quality of Life: If you were to spend the rest of your life with your urinary condition just the way it is now, how would you feel about that? 6 (P)     AUA SYMPTOM SCORE 27 (P)            Vitals  Vitals:    07/24/23 1037   BP: 128/82   BP Location: Right arm   Patient Position: Sitting   Cuff Size: Adult   Pulse: 82   SpO2: 97%       Physical Exam  Vitals reviewed. Constitutional:       General: He is not in acute distress. Appearance: Normal appearance. He is not ill-appearing, toxic-appearing or diaphoretic. HENT:      Head: Normocephalic and atraumatic.    Eyes:      Conjunctiva/sclera: Conjunctivae normal.   Pulmonary:      Effort: Pulmonary effort is normal. No respiratory distress. Abdominal:      General: There is no distension. Palpations: Abdomen is soft. Tenderness: There is no abdominal tenderness. There is no right CVA tenderness, left CVA tenderness, guarding or rebound. Musculoskeletal:         General: Normal range of motion. Cervical back: Normal range of motion. Skin:     General: Skin is warm and dry. Neurological:      General: No focal deficit present. Mental Status: He is alert and oriented to person, place, and time. Psychiatric:         Mood and Affect: Mood normal.         Behavior: Behavior normal.         Thought Content: Thought content normal.         Judgment: Judgment normal.           Past History  Past Medical History:   Diagnosis Date   • Acute prostatitis    • Arthritis    • BPH with obstruction/lower urinary tract symptoms    • Cervicalgia    • Dysuria    • Erectile dysfunction    • Feeling of incomplete bladder emptying    • Frequency of urination    • Nocturia    • Urinary retention      Social History     Socioeconomic History   • Marital status: /Civil Union     Spouse name: None   • Number of children: None   • Years of education: None   • Highest education level: None   Occupational History   • None   Tobacco Use   • Smoking status: Former     Types: Cigarettes     Quit date: 1972     Years since quittin.5   • Smokeless tobacco: Never   Vaping Use   • Vaping Use: Never used   Substance and Sexual Activity   • Alcohol use:  Yes   • Drug use: No   • Sexual activity: None   Other Topics Concern   • None   Social History Narrative   • None     Social Determinants of Health     Financial Resource Strain: Not on file   Food Insecurity: Not on file   Transportation Needs: Not on file   Physical Activity: Not on file   Stress: Not on file   Social Connections: Not on file   Intimate Partner Violence: Not on file   Housing Stability: Not on file     Social History Tobacco Use   Smoking Status Former   • Types: Cigarettes   • Quit date: 1972   • Years since quittin.5   Smokeless Tobacco Never     Family History   Problem Relation Age of Onset   • Pneumonia Mother    • Heart attack Father        The following portions of the patient's history were reviewed and updated as appropriate: allergies, current medications, past medical history, past social history, past surgical history and problem list.    Results  No results found for this or any previous visit (from the past 1 hour(s)).]  Lab Results   Component Value Date    PSA 1.4 2019    PSA 0.8 2018     No results found for: "GLUCOSE", "CALCIUM", "NA", "K", "CO2", "CL", "BUN", "CREATININE"  No results found for: "WBC", "HGB", "HCT", "MCV", "PLT"    Lorie Em PA-C

## 2023-08-03 ENCOUNTER — IN HOME VISIT (OUTPATIENT)
Dept: GERIATRICS | Facility: OTHER | Age: 83
End: 2023-08-03
Payer: MEDICARE

## 2023-08-03 DIAGNOSIS — E55.9 VITAMIN D DEFICIENCY: ICD-10-CM

## 2023-08-03 DIAGNOSIS — M16.0 PRIMARY OSTEOARTHRITIS OF BOTH HIPS: ICD-10-CM

## 2023-08-03 DIAGNOSIS — G89.29 CHRONIC LOW BACK PAIN, UNSPECIFIED BACK PAIN LATERALITY, UNSPECIFIED WHETHER SCIATICA PRESENT: ICD-10-CM

## 2023-08-03 DIAGNOSIS — N13.8 BPH WITH OBSTRUCTION/LOWER URINARY TRACT SYMPTOMS: ICD-10-CM

## 2023-08-03 DIAGNOSIS — G30.1 LATE ONSET ALZHEIMER'S DEMENTIA WITHOUT BEHAVIORAL DISTURBANCE (HCC): Primary | ICD-10-CM

## 2023-08-03 DIAGNOSIS — I73.9 PERIPHERAL VASCULAR DISEASE (HCC): ICD-10-CM

## 2023-08-03 DIAGNOSIS — R26.2 AMBULATORY DYSFUNCTION: ICD-10-CM

## 2023-08-03 DIAGNOSIS — F02.80 LATE ONSET ALZHEIMER'S DEMENTIA WITHOUT BEHAVIORAL DISTURBANCE (HCC): Primary | ICD-10-CM

## 2023-08-03 DIAGNOSIS — N40.1 BPH WITH OBSTRUCTION/LOWER URINARY TRACT SYMPTOMS: ICD-10-CM

## 2023-08-03 DIAGNOSIS — M54.50 CHRONIC LOW BACK PAIN, UNSPECIFIED BACK PAIN LATERALITY, UNSPECIFIED WHETHER SCIATICA PRESENT: ICD-10-CM

## 2023-08-03 PROBLEM — M25.552 PAIN OF BOTH HIP JOINTS: Status: ACTIVE | Noted: 2018-08-15

## 2023-08-03 PROBLEM — M48.062 SPINAL STENOSIS OF LUMBAR REGION WITH NEUROGENIC CLAUDICATION: Status: ACTIVE | Noted: 2018-07-12

## 2023-08-03 PROBLEM — E78.2 MIXED HYPERLIPIDEMIA: Status: ACTIVE | Noted: 2020-01-22

## 2023-08-03 PROBLEM — M16.12 OSTEOARTHRITIS OF LEFT HIP: Status: ACTIVE | Noted: 2018-11-27

## 2023-08-03 PROBLEM — M47.817 SPONDYLOSIS OF LUMBOSACRAL JOINT WITHOUT MYELOPATHY: Status: ACTIVE | Noted: 2018-07-12

## 2023-08-03 PROBLEM — M25.551 PAIN OF BOTH HIP JOINTS: Status: ACTIVE | Noted: 2018-08-15

## 2023-08-03 PROCEDURE — 99344 HOME/RES VST NEW MOD MDM 60: CPT | Performed by: NURSE PRACTITIONER

## 2023-08-03 RX ORDER — DONEPEZIL HYDROCHLORIDE 10 MG/1
10 TABLET, FILM COATED ORAL
COMMUNITY

## 2023-08-03 RX ORDER — ACETAMINOPHEN 325 MG/1
650 TABLET ORAL EVERY 6 HOURS PRN
COMMUNITY

## 2023-08-03 NOTE — ASSESSMENT & PLAN NOTE
Patient followed and managed by [facility] Urology office as pout-patient. Last seen on 7/24/2023:  = notes reviewed. = Previously on Sanctura XR - D/C'd on this visit. No change in symptoms. = daughter agreeable to change and declined Myrbetriq.  = Follow-up in 6 months.   Continue Alfuzosin 10mg daily  Continue KIMBERLEY supportive care

## 2023-08-03 NOTE — ASSESSMENT & PLAN NOTE
Alert and oriented x 2 (name & birthday)  Minicog test score (8/3/2023): 0/5  With ambulatory dysfunction - hx of 2 falls on the last 3 months (fell off the bed).   Continue to redirect/reorient as often as needed  Weight gain on review  No recent labs on file (last done on Oct, 2021)  Continue Donepezil 10mg daily  Continue Vit B12 500mcg daily  Check labs: CBC with diff, CMP, TSH with reflex Free T4, Vit D, Vit B12, Lipid Panel on 8/8/2023

## 2023-08-03 NOTE — ASSESSMENT & PLAN NOTE
Not on statin/ anticoagulation. No B/L LE edema with intact skin. Patient denies pain/numbness/neuropathy on assessment  Last Lipid test done on 2014.

## 2023-08-03 NOTE — ASSESSMENT & PLAN NOTE
TUG test (8/3/2023): less than 20 sec. Steady gait. Slow ambulation using walker with short shallow steps.   Hx of fall ( 2 x in the last 3 month: fell off the bed)  Continue KIMBERLEY supportive care  Per daughter - patient is on restorative therapy

## 2023-08-03 NOTE — ASSESSMENT & PLAN NOTE
Last Vit D level done on 1/24/2020 = 11 (L)  Currently on Vit D 2000 units daily  Check Vit D level on 8/8/2023.

## 2023-08-03 NOTE — ASSESSMENT & PLAN NOTE
Patient denies any pain on assessment  Patient denies any pain on ambulation  Start Tylenol 650mg Q6 hours PRN  Continue pain assessment Q shift while awake  Follows [facility] Orthopedic office as out-patient

## 2023-08-03 NOTE — PROGRESS NOTES
[address], [id number] [place] [address], [facility]  [phone]    NAME: Rick Amezcua  AGE: 78 y.o. SEX: male    Progress Note    [facility]  POS: 13    Assessment/Plan:    Late onset Alzheimer's dementia without behavioral disturbance ([address])  Alert and oriented x 2 (name & birthday)  Minicog test score (8/3/2023): 0/5  With ambulatory dysfunction - hx of 2 falls on the last 3 months (fell off the bed). Continue to redirect/reorient as often as needed  Weight gain on review  No recent labs on file (last done on Oct, 2021)  Continue Donepezil 10mg daily  Continue Vit B12 500mcg daily  Check labs: CBC with diff, CMP, TSH with reflex Free T4, Vit D, Vit B12, Lipid Panel on 8/8/2023    BPH with obstruction/lower urinary tract symptoms  Patient followed and managed by [facility] office as pout-patient. Last seen on 7/24/2023:  = notes reviewed. = Previously on Sanctura XR - D/C'd on this visit. No change in symptoms. = daughter agreeable to change and declined Myrbetriq.  = Follow-up in 6 months. Continue Alfuzosin 10mg daily  Continue intermediate supportive care    Primary osteoarthritis of both hips  Patient denies any pain on assessment  Patient denies any pain on ambulation  Start Tylenol 650mg Q6 hours PRN  Continue pain assessment Q shift while awake  Follows [facility] Orthopedic office as out-patient    Low back pain  Multifactorial. Start Tylenol 650mg Q6 hours PRN    Peripheral vascular disease ([address])  Not on statin/ anticoagulation. No B/L LE edema with intact skin. Patient denies pain/numbness/neuropathy on assessment  Last Lipid test done on 2014. Ambulatory dysfunction  TUG test (8/3/2023): less than 20 sec. Steady gait. Slow ambulation using walker with short shallow steps.   Hx of fall ( 2 x in the last 3 month: fell off the bed)  Continue KIMBERLEY supportive care  Per daughter - patient is on restorative therapy    Vitamin D deficiency  Last Vit D level done on 1/24/2020 = 11 (L)  Currently on Vit D 2000 units daily  Check Vit D level on 8/8/2023. Chief complaint / Reason for visit: Transition of Care Visit    History of Present Illness: This is an 40-year-old male patient residing at [facility]. Patient is seen and examined today as a transition of care visit related to change follow-up PCP to follow-up acute on chronic medical conditions: Late Onset Alzheimer's Dementia, BPH with LUTS, Osteoarthritis of B/L hips, Chronic Low Back pain (stenosis of lumbar region and Spondylosis of lumbosacral joints), Ambulatory dysfunction and Vit D deficiency. Patient is initially in bed taking a nap after lunch -able to awaken without difficulty and maintain full wakefulness during this visit. Patient is cooperative, calm, very pleasant and not in distress. Patient is verbal with clear coherent speech -oriented to name and birthday only. Patient is able to remember the general address but not name of the facility. Patient is also unable to remember current date. Mini-Cog test done at this visit: 0/5 = unable to recall 3 words and a very abnormal clock drawing. Patient reports feeling well on this visit -denies any acute medical concerns during ROS assessment including pain. Patient is able to ambulate using a walker -very slow but shallow steps with a steady gait. Per nursing, no acute medical concerns for this visit. Called and spoken with daughter - discussed reason for visit/ findings/ plan of care as indicated above - agreeable. No concerns reported on this visit. Per daughter, patient see Ophthalmology/ Dermatology office for cataract/ skin assessment as out-patient. Nursing and prior provider notes reviewed on this visit. Discussed visit with PCP and nursing staff/ supervisor. Review of Systems:  Per history of present illness, all other systems reviewed and negative.     HISTORY:  Medical Hx: Reviewed, unchanged  Family Hx: Reviewed, unchanged  Soc Hx: Reviewed,  unchanged    ALLERGY: Reviewed, unchanged. No Known Allergies     PHYSICAL EXAM:  Vital Signs: T98.0F -P78 -R16 BP: 152/70 (7/31/2023) SpO2: 94% RA  Weight: 171.6 lbs (8/3/2023) <= 170.3 lbs (7/6/2023) <= 166.4 lbs (6/1/2023)  Height: 6.0 Feet    General: NAD. Well appearing. No acute distress. Head: Atraumatic. Normocephalic. Eye Exam: anicteric sclera, no discharge, PERRLA, No injection. Wears glasses  Oral Exam: moist mucous membranes, no buccaloropharyngeal erythema, palatine tonsils WNL. Neck Exam: no anterior cervical lymphadenopathy noted, neck supple. Trachea midline, no carotid bruit, no masses  Cardiovascular: regular rate, irregular rhythm, no murmurs, rubs, or gallops. S1 and S2.  Pulmonary: no wheeze, no rhonchi, no rales. No chest tenderness. Normal chest wall expansion  Abdominal: soft, non-tender, nondistended, bowel sounds audible x 4 quadrants. No palpable hepatosplenomegaly, no tympany  : Non distended bladder. Continent. Extremities and skin: no edema noted, no rashes. Intact skin  Neurological: alert, cooperative and responsive, Oriented x 2. No tics, normal sensation to pressure and light touch. moving all 4 extremities symmetrically. ROM to UE: WNL. TUG test: < 20 secs. Steady gait with walker. Slow with short / shallow steps. Psych: Minicog test score (8/3/2023): 0/5. Laboratory results / Imaging reviewed: Hard copy/ies in medical chart. Last labs done on Oct, 2021. Current Medications: All medications reviewed and updated in Nursing Home Chart    Please note: This note was completed in part utilizing a voice-recognition software may have been used in the preparation of this document. Grammatical errors, random word insertion, spelling mistakes, and incomplete sentences may be an occasional consequence of the system secondary to software limitations, ambient noise and hardware issues.  Occasional wrong word or "sound-alike" substitutions may have occurred due to the inherent limitations of voice recognition software. At the time of dictation, efforts were made to edit, clarify and/or correct errors. Interpretation should be guided by context. Please read the chart carefully and recognize, using context, where substitutions have occurred. If you have any questions or concerns about the context, text or information contained within the body of this dictation, please contact myself, the provider, for further clarification.       SHYAM Wilde  8/3/2023

## 2023-11-16 ENCOUNTER — IN HOME VISIT (OUTPATIENT)
Dept: GERIATRICS | Facility: OTHER | Age: 83
End: 2023-11-16
Payer: MEDICARE

## 2023-11-16 DIAGNOSIS — Z00.00 MEDICARE ANNUAL WELLNESS VISIT, INITIAL: Primary | ICD-10-CM

## 2023-11-16 PROCEDURE — G0438 PPPS, INITIAL VISIT: HCPCS | Performed by: NURSE PRACTITIONER

## 2023-11-16 NOTE — PROGRESS NOTES
[address], [address] [place] [address] [address]  [phone]    NAME: Cam Arrington  AGE: 78 y.o. SEX: male    Progress Note    Location: [facility] Damaricash Escalante)  POS: 13    Vital Signs: T98.9F -P84-R18 BP: 133/79 SpO2: 97% RA  Weight: 218.0 lbs (11/16/2023) <= 213.6 lbs (10/19/2023) <= 220.0 lbs (9/21/2023)    Laurel Burkett is here for his Initial Wellness visit. Health Risk Assessment:   Patient rates overall health as good. Patient feels that their physical health rating is same. Patient is satisfied with their life. Eyesight was rated as same. Hearing was rated as same. Patient feels that their emotional and mental health rating is slightly better. Patients states they are never, rarely angry. Patient states they are sometimes unusually tired/fatigued. Pain experienced in the last 7 days has been some. Patient's pain rating has been 3/10. Patient states that he has experienced no weight loss or gain in last 6 months. This is an 78-year-old male patient residing in [facility]. Patient is alert, cooperative, calm, very pleasant and not in distress. Patient is verbally engaged with clear coherent speech -oriented to name and birthday only. Patient has ambulatory dysfunction -multifactorial - Patient reports pain to low back and when he walks. Patient seen Orthopedic Office to manage osteoarthritis pain. Otherwise patient acknowledges feeling all right today. Patient reports eating and sleeping well. Patient reports feeling safe in his current home environment. Patient acknowledges that his needs are met by nursing staff. Nursing reported the patient does have a history of falls usually rolls out of bed but has never had falls while ambulating. Patient uses walker on ambulation -has leaning forward gait -short/shallow steps versus dragging feet. Patient is incontinent of B/B and needs assistance with incontinence care.   Consistent with this extensively with patient's daughter -agreed on assessment. Patient has a strong family support system and involved with care. Patient continues to state specialist.    Depression Screening:   PHQ-2 Score: 0      Fall Risk Screening: In the past year, patient has experienced: history of falling in past year    Number of falls: 2 or more  Injured during fall?: No    Feels unsteady when standing or walking?: Yes    Worried about falling?: Yes      Home Safety:  Patient has trouble with stairs inside or outside of their home. Patient has working smoke alarms and has working carbon monoxide detector. Home safety hazards include: none. Patient resides in an [facility] with 24-hour nursing care services. Patient does not use stairs -walks with walker. Nutrition:   Current diet is Regular. Regular texture with thin liquids. Patient denies any swallowing concerns or difficulties both for meals and medicine and liquids. Medications:   Patient is currently taking over-the-counter supplements. OTC medications include: see medication list. Vit D, Vit B12, Tylenol. Patient is not able to manage medications. Medication managed and administered by nursing staff. Activities of Daily Living (ADLs)/Instrumental Activities of Daily Living (IADLs):   Walk and transfer into and out of bed and chair?: No  Dress and groom yourself?: No    Bathe or shower yourself?: No    Feed yourself? Yes  Do your laundry/housekeeping?: No  Manage your money, pay your bills and track your expenses?: No  Make your own meals?: No    Do your own shopping?: No    ADL comments: Patient resides in an [facility] -and has assistance with CNA's for ADLs and ambulation. Durable Medical Equipment Suppliers  None    Previous Hospitalizations:   Any hospitalizations or ED visits within the last 12 months?: No      Hospitalization Comments: Last known hospitalization 2019    Advance Care Planning:   Living will: Yes    Durable POA for healthcare:  Yes    Advanced directive: Yes    Advanced directive counseling given: No    Five wishes given: No    Patient declined ACP directive: No    End of Life Decisions reviewed with patient: No    Provider agrees with end of life decisions: Yes      Comments: No change per POA (Daughter). Cognitive Screening:   Provider or family/friend/caregiver concerned regarding cognition?: Yes  Mini-Cog Score: 0  Interpretation: Mini-Cog Score 0-2: Positive screen for dementia    Cognition Comments: Patient has a diagnosis for late onset Alzheimer's dementia    PREVENTIVE SCREENINGS      Cardiovascular Screening:    General: History Lipid Disorder and Screening Current      Diabetes Screening:     General: Risks and Benefits Discussed      Colorectal Cancer Screening:     General: Screening Not Indicated and Risks and Benefits Discussed    Due for: Cologuard      Prostate Cancer Screening:    General: Screening Not Indicated and Screening Current      Osteoporosis Screening:    General: Screening Not Indicated      Abdominal Aortic Aneurysm (AAA) Screening:    Risk factors include: tobacco use        General: Screening Not Indicated      Lung Cancer Screening:     General: Screening Not Indicated      Hepatitis C Screening:    General: Screening Not Indicated and Risks and Benefits Discussed    Hep C Screening Accepted: Yes        Preventive Screening Comments: Patient's POA reported prior Coloscopy > 8 years ago - interested in pursuing Cologuard testing, Hep C test and HbA1C test as patient had not any done in the past.  POA does not want any further colonoscopy based age and risk versus benefits. Screening, Brief Intervention, and Referral to Treatment (SBIRT)    Screening  Typical number of drinks in a day: 0  Typical number of drinks in a week: 0  Interpretation: Low risk drinking behavior.     Single Item Drug Screening:  How often have you used an illegal drug (including marijuana) or a prescription medication for non-medical reasons in the past year? never    Single Item Drug Screen Score: 0  Interpretation: Negative screen for possible drug use disorder    Brief Intervention      Not applicable    Time Spent  Time spent screening/evaluating the patient for alcohol misuse: 0 minutes. Time spent providing alcohol/substance abuse assessment and intervention services: 0 minutes. Other Counseling Topics:   Car/seat belt/driving safety, skin self-exam, sunscreen and calcium and vitamin D intake and regular weightbearing exercise. Patient is a very strong support system and highly involved in his care. POA would like to get patient tested for hemoglobin A1c/hepatitis C as patient has not had any testing in the past.  Tran Clancy is not interested to have patient undergo colonoscopy but would like to pursue Cologuard instead. Patient has scheduled labs in place.         SHYAM Hurley  11/16/2023

## 2023-11-21 ENCOUNTER — IN HOME VISIT (OUTPATIENT)
Dept: GERIATRICS | Facility: OTHER | Age: 83
End: 2023-11-21
Payer: MEDICARE

## 2023-11-21 DIAGNOSIS — U07.1 COVID-19 VIRUS DETECTED: Primary | ICD-10-CM

## 2023-11-21 DIAGNOSIS — G30.1 LATE ONSET ALZHEIMER'S DEMENTIA WITHOUT BEHAVIORAL DISTURBANCE (HCC): ICD-10-CM

## 2023-11-21 DIAGNOSIS — R26.2 AMBULATORY DYSFUNCTION: ICD-10-CM

## 2023-11-21 DIAGNOSIS — F02.80 LATE ONSET ALZHEIMER'S DEMENTIA WITHOUT BEHAVIORAL DISTURBANCE (HCC): ICD-10-CM

## 2023-11-21 LAB
HBA1C MFR BLD HPLC: 5.9 %
HCV AB SER-ACNC: NON REACTIVE

## 2023-11-21 PROCEDURE — 99348 HOME/RES VST EST LOW MDM 30: CPT | Performed by: NURSE PRACTITIONER

## 2023-11-21 RX ORDER — IPRATROPIUM BROMIDE AND ALBUTEROL SULFATE 2.5; .5 MG/3ML; MG/3ML
3 SOLUTION RESPIRATORY (INHALATION) EVERY 12 HOURS
COMMUNITY
Start: 2023-11-21 | End: 2023-11-21 | Stop reason: SDUPTHER

## 2023-11-21 RX ORDER — IPRATROPIUM BROMIDE AND ALBUTEROL SULFATE 2.5; .5 MG/3ML; MG/3ML
3 SOLUTION RESPIRATORY (INHALATION) EVERY 12 HOURS
Qty: 30 ML | Refills: 0 | Status: SHIPPED | OUTPATIENT
Start: 2023-11-21 | End: 2023-11-26

## 2023-11-21 NOTE — ASSESSMENT & PLAN NOTE
Continue FDC supportive care  Continue to monitor nutritional/ fluid intake/ sleep / mood changes  Continue to monitor for signs of delirium

## 2023-11-21 NOTE — PROGRESS NOTES
[facility]  [address] [place] [address], [address] [place] [address], [facility] [address]  [phone]    NAME: Leanna Matthews  AGE: 78 y.o. SEX: male    Progress Note    Location: [facility] Ольга)  POS: 13    Assessment/Plan:    COVID-19 virus detected  Patient tested by rapid test today (11/21/2023): POSITIVE  Symptomatic for nasal congestion, intermittent non productive cough since the weekend  Nursing reported fall incident and low grade fever: T100. F early today. Had prior COVID-19 test (11/16/2023) and found to be negative (requested due to rhinorrhea)  Patient poor historian due to dementia: denies any acute medical concerns for this visit including pain  Extend Mucinex ER 600mg Q12 hours for a total of 7 days. Check:  - CXR (2 views)  - Labs: CBC with diff and CMP  - UA with C&S  Per nursing, daughter is made aware and agreeable to start COVID-19 protocol x 10 days and anti-viral (daughter prefers use of Paxlovid). = HOLD use of Melatonin 20mg at HS  V/S daily x 10 days  Start Duo-neb Q12 hours x 5 days  Continue facility protocol for COVID-19 infection  Encourage oral fluids and monitor meal intake  At risk for hospitalization    Ambulatory dysfunction  Unwitnessed fall without evident injury/trauma  Patient denies any pain  Nursing to continue to monitor for any change in ambulation/new pain or worsening pain. Late onset Alzheimer's dementia without behavioral disturbance ([address])  Continue jail supportive care  Continue to monitor nutritional/ fluid intake/ sleep / mood changes  Continue to monitor for signs of delirium    Chief complaint / Reason for visit:  Acute Visit    History of Present Illness: This is an 15-year-old male patient residing at [facility]. Patient is seen and examined today per nursing request for a recent fall this morning and a positive rapid COVID-19 testing done today. Per nursing, fall was unwitnessed around 4:00 AM without evident injury and VSS.   Patient was tested immediately after the fall and found to be positive. Nursing reported patient has been presenting with coughing since the weekend but low-grade fever started this morning. Patient was previously tested w/ rapid COVID test on 11/16/2023 for rhinorrhea as requested by this provider: negative. Patient was started on Mucinex ER to complete in 5 days then (11/16/2023) to improve congestion. Patient is in bed for this visit -appears patient with fatigue presentation -asleep but was easily awakened and is able to maintain full wakefulness -cooperative without difficulty. Patient afebrile on this visit. Noted paroxysmal moist sounding but nonproductive coughing at bedside with scattered rhonchi on assessment. No hypoxia on room air. Patient does not seem to be in respiratory distress. Patient denies any acute medical concerns during ROS assessment including pain. Nursing reported this provider that daughter is ready informed of the rapid COVID test this morning -agreeable to start the facility COVID protocol and preferred use of Paxlovid if appropriate. Due to patient's somnolence and fatigue presentation -we will hold use of melatonin 20 mg at bedtime as part of the protocol. Stat labs and chest x-ray ordered as well. Nursing to continue monitoring for new or worsening symptoms. Patient with COVID-19 vaccination x 3 doses (per immunization record). Called and spoken with daughter - updated on patient conditions/ findings/ plan of care as indicated above/ lab orders/ COVID-19 protocol - agreeable. Daughter is agreeable to escalate care if patient worsen: hospital transfer or transfer to [facility] if appropriate. Nursing and prior provider notes reviewed on this visit. Discussed visit with PCP and nursing staff/ supervisor. Review of Systems:  Per history of present illness, all other systems reviewed and negative.     HISTORY:  Medical Hx: Reviewed, unchanged  Family Hx: Reviewed, unchanged  Soc Hx: Reviewed,  unchanged    ALLERGY: Reviewed, unchanged. No Known Allergies     PHYSICAL EXAM:  Vital Signs: T100.0F -/min -R18 BP: 113/70 SpO2: 98% RA  Weight: 218.0 lbs (11/16/2023) <= 209.4 lbs (10/12/2023) <= 222.0 lnbs (9/14/2023)     General: Fatigued presentation. No acute distress. Head: Atraumatic. Normocephalic. Eye Exam: anicteric sclera, no discharge, PERRLA, No injection. Oral Exam: moist mucous membranes, Slight erythema to  buccaloropharyngeal, palatine tonsils WNL. Nose: slight erythema to nasal mucosa, clear rhinorrhea  Neck Exam: no anterior cervical lymphadenopathy noted, neck supple. Trachea midline, no carotid bruit, no masses  Cardiovascular: regular rate, irregular rhythm, no murmurs, rubs, or gallops. S1 and S2.  Pulmonary: no wheeze, (+) scattered rhonchi - cleared with coughing, no rales. No chest tenderness. Normal chest wall expansion. No hypoxia on RA. non productive, moist sounding coughing  Abdominal: soft, non-tender, nondistended, bowel sounds audible x 4 quadrants. No palpable hepatosplenomegaly, no tympany  : Non distended bladder. Continent. Extremities and skin: no edema noted, no rashes. Intact skin  Neurological: alert, cooperative and responsive, No tics, normal sensation to pressure and light touch. moving all 4 extremities. Ambulatory dysfunction. Laboratory results / Imaging reviewed: Hard copy/ies in medical chart. Last labs done on Aug, 2023. Current Medications: All medications reviewed and updated in Nursing Home Chart    Please note: This note was completed in part utilizing a voice-recognition software may have been used in the preparation of this document. Grammatical errors, random word insertion, spelling mistakes, and incomplete sentences may be an occasional consequence of the system secondary to software limitations, ambient noise and hardware issues.  Occasional wrong word or "sound-alike" substitutions may have occurred due to the inherent limitations of voice recognition software. At the time of dictation, efforts were made to edit, clarify and/or correct errors. Interpretation should be guided by context. Please read the chart carefully and recognize, using context, where substitutions have occurred. If you have any questions or concerns about the context, text or information contained within the body of this dictation, please contact myself, the provider, for further clarification.       SHYAM Munguia  11/21/2023

## 2023-11-21 NOTE — ASSESSMENT & PLAN NOTE
Unwitnessed fall without evident injury/trauma  Patient denies any pain  Nursing to continue to monitor for any change in ambulation/new pain or worsening pain.

## 2023-12-12 ENCOUNTER — IN HOME VISIT (OUTPATIENT)
Dept: GERIATRICS | Facility: OTHER | Age: 83
End: 2023-12-12
Payer: MEDICARE

## 2023-12-12 DIAGNOSIS — G30.1 LATE ONSET ALZHEIMER'S DEMENTIA WITHOUT BEHAVIORAL DISTURBANCE (HCC): Primary | ICD-10-CM

## 2023-12-12 DIAGNOSIS — N13.8 BPH WITH OBSTRUCTION/LOWER URINARY TRACT SYMPTOMS: ICD-10-CM

## 2023-12-12 DIAGNOSIS — M16.0 PRIMARY OSTEOARTHRITIS OF BOTH HIPS: ICD-10-CM

## 2023-12-12 DIAGNOSIS — E55.9 VITAMIN D DEFICIENCY: ICD-10-CM

## 2023-12-12 DIAGNOSIS — E78.2 MIXED HYPERLIPIDEMIA: ICD-10-CM

## 2023-12-12 DIAGNOSIS — F02.80 LATE ONSET ALZHEIMER'S DEMENTIA WITHOUT BEHAVIORAL DISTURBANCE (HCC): Primary | ICD-10-CM

## 2023-12-12 DIAGNOSIS — N40.1 BPH WITH OBSTRUCTION/LOWER URINARY TRACT SYMPTOMS: ICD-10-CM

## 2023-12-12 DIAGNOSIS — R26.2 AMBULATORY DYSFUNCTION: ICD-10-CM

## 2023-12-12 PROCEDURE — 99349 HOME/RES VST EST MOD MDM 40: CPT | Performed by: NURSE PRACTITIONER

## 2023-12-13 PROBLEM — M46.1 BILATERAL SACROILIITIS (HCC): Status: ACTIVE | Noted: 2021-09-30

## 2023-12-13 PROBLEM — G47.33 OBSTRUCTIVE SLEEP APNEA: Status: ACTIVE | Noted: 2020-01-22

## 2023-12-13 NOTE — ASSESSMENT & PLAN NOTE
No reported new or worsening symptoms  Continue Alfuzosin ER 10mg daily  Patient followed and managed by DG [facility] Urology office as pout-patient. Last seen on 7/24/2023. Follow-up in 6 months.

## 2023-12-13 NOTE — PROGRESS NOTES
[facility]  [address] [facility], [address] [place] [address], [facility] [address]  [phone]    NAME: Garett Gonsalez  AGE: 78 y.o. SEX: male    Progress Note    Location: [facility] Kavin Nolan)  POS: 13    Assessment/Plan:    Late onset Alzheimer's dementia without behavioral disturbance ([address] [facility])  With ambulatory dysfunction  - hx of falls  - slowed but steady gait on ambulation  Continue AD for mobility/ turning/ transfer  Continue to redirect/reorient as often as needed  Weight gain on review  No Anemia/ Renal & Hepatic functions WNL (11/21/2023)  TSH WNL (Aug, 2023)  Vit B12/ Vit D WNL (Aug, 2023)  Continue Donepezil 10mg daily/ Vit B12 500mcg daily  Followed and managed by [facility]. BPH with obstruction/lower urinary tract symptoms  No reported new or worsening symptoms  Continue Alfuzosin ER 10mg daily  Patient followed and managed by [facility] office as pout-patient. Last seen on 7/24/2023. Follow-up in 6 months. Primary osteoarthritis of both hips  Patient denies any pain on assessment  Continue Tylenol 650mg Q6 hours PRN  Continue pain assessment Q shift while awake  Follows [facility] office as out-patient    Vitamin D deficiency  Vit D level (8/8/2023) = 35 <= 11 (1/24/2022)  Continue Vit D 2000 units    Ambulatory dysfunction  Multifactorial.  Hx of falls in [facility]  Continue safety and fall precautions. Mixed hyperlipidemia  Lipid test WNL (8/8/2023)  Not on statin therapy      Chief complaint / Reason for visit: Follow- visit    History of Present Illness: This is an 78-year-old male patient residing at [facility]. Patient is seen and examined today to follow-up acute and chronic edical conditions: Late Onset Alzheimer's Dementia, BPH with LUTS, Osteoarthritis of B/L hips, Chronic Low Back pain (stenosis of lumbar region and Spondylosis of lumbosacral joints), Ambulatory dysfunction and Vit D deficiency.      Patient is OOB for this visit - sitting in recliner in room - initially napping - easily awakened when name is called and able to maintain full wakefulness on this visit. Patient is verbal with clear coherent speech -oriented to name and birthdate only. Patient acknowledged feeling well on this visit, "I'm alright" -denies any acute medical concerns during ROS assessment including pain. Patient denies any coughing, shortness of breath or change in appetite. Per nursing, patient remains at baseline -progressive decline in ambulation with history of falls without evident injury -otherwise no acute medical concerns for this visit. Recent COVID-19 virus infection on 11/21/2023 - completed Paxlovid therapy. Patient POA (daughter) - discussed reason for visit/ findings/ plan of care as indicated above - agreeable. Expressed concerns about his not moving as much as he used to. Daughter reported that patient is seeing pain management for steroid injection to back and knees but worried about his ability to be comfortable during transport to and from appointment - would like to patient be seen steroid injection continues in house by visiting provider if available. Daughter will send visit records from prior visit. Will get back to daughter to confirm physiatrist schedule - will have nursing supvr of facility call daughter for schedule and consent. Nursing and prior provider notes reviewed on this visit. Discussed visit with PCP and nursing staff/ supervisor. Review of Systems:  Per history of present illness, all other systems reviewed and negative. HISTORY:  Medical Hx: Reviewed, unchanged  Family Hx: Reviewed, unchanged  Soc Hx: Reviewed,  unchanged    ALLERGY: Reviewed, unchanged. No Known Allergies     PHYSICAL EXAM:  Vital Signs: T 98.0F- HR62 -R16 -BP: 131/85 (11/28/2203) SpO2 96% RA  Weight 208.7 lbs (122/7/2023) <= 216.6 lbs (11/2/2023) <= 210.0 lbs (10/5/2023)    General: NAD. Well appearing. No acute distress  Head: Atraumatic. Normocephalic.   Eye Exam: anicteric sclera, no discharge, PERRLA, No injection  Oral Exam: moist mucous membranes, no buccaloropharyngeal erythema, palatine tonsils WNL. Neck Exam: no anterior cervical lymphadenopathy noted, neck supple. Trachea midline, no carotid bruit, no masses  Cardiovascular: regular rate, irregular rhythm, no: murmurs/ rubs/ gallops. S1 and S2 appreciated. Pulmonary: no wheeze, no rhonchi, no rales. No chest tenderness. Normal chest wall expansion. Abdominal: soft, non-tender, nondistended, bowel sounds audible x 4 quadrants. No palpable hepatosplenomegaly, no tympany  : Non distended bladder. Continent. Extremities and skin: no edema noted, no rashes. Intact skin  Neurological: alert, cooperative and responsive, Oriented x 2. No tics, normal sensation to pressure and light touch.  moving all 4 extremities symmetrically. Ambulatory dysfunction - uses AD    Laboratory / Imaging results reviewed. Current Medications: All medications reviewed and updated in [address] [place] [address]. Please note: This note was completed in part utilizing a voice-recognition software may have been used in the preparation of this document. Grammatical errors, random word insertion, spelling mistakes, and incomplete sentences may be an occasional consequence of the system secondary to software limitations, ambient noise and hardware issues. Occasional wrong word or "sound-alike" substitutions may have occurred due to the inherent limitations of voice recognition software. At the time of dictation, efforts were made to edit, clarify and/or correct errors. Interpretation should be guided by context. Please read the chart carefully and recognize, using context, where substitutions have occurred. If you have any questions or concerns about the context, text or information contained within the body of this dictation, please contact myself, the provider, for further clarification.       SHYAM Demarco  12/13/2023

## 2023-12-13 NOTE — ASSESSMENT & PLAN NOTE
Patient denies any pain on assessment  Continue Tylenol 650mg Q6 hours PRN  Continue pain assessment Q shift while awake  Follows [facility]opedic office as out-patient

## 2023-12-13 NOTE — ASSESSMENT & PLAN NOTE
With ambulatory dysfunction  - hx of falls  - slowed but steady gait on ambulation  Continue AD for mobility/ turning/ transfer  Continue to redirect/reorient as often as needed  Weight gain on review  No Anemia/ Renal & Hepatic functions WNL (11/21/2023)  TSH WNL (Aug, 2023)  Vit B12/ Vit D WNL (Aug, 2023)  Continue Donepezil 10mg daily/ Vit B12 500mcg daily  Followed and managed by [facility].

## 2023-12-19 ENCOUNTER — IN HOME VISIT (OUTPATIENT)
Dept: GERIATRICS | Facility: OTHER | Age: 83
End: 2023-12-19
Payer: MEDICARE

## 2023-12-19 DIAGNOSIS — N13.8 BPH WITH OBSTRUCTION/LOWER URINARY TRACT SYMPTOMS: ICD-10-CM

## 2023-12-19 DIAGNOSIS — N40.1 BPH WITH OBSTRUCTION/LOWER URINARY TRACT SYMPTOMS: ICD-10-CM

## 2023-12-19 DIAGNOSIS — R53.83 LETHARGY: Primary | ICD-10-CM

## 2023-12-19 DIAGNOSIS — G30.1 LATE ONSET ALZHEIMER'S DEMENTIA WITHOUT BEHAVIORAL DISTURBANCE (HCC): ICD-10-CM

## 2023-12-19 DIAGNOSIS — F02.80 LATE ONSET ALZHEIMER'S DEMENTIA WITHOUT BEHAVIORAL DISTURBANCE (HCC): ICD-10-CM

## 2023-12-19 PROCEDURE — 99348 HOME/RES VST EST LOW MDM 30: CPT | Performed by: NURSE PRACTITIONER

## 2023-12-19 NOTE — ASSESSMENT & PLAN NOTE
"Somnolence and \" not himself\" x 2 days  Low grade fever : T99.0F this morning  Physical examination: unremarkable  STAT labs (12/19/2023): No leukocytosis with differentials WNL. No anemia. Renal and hepatic functions WNL  V/S daily x 5 days  Nursing to continue to monitor for new or worsening symptoms.  Pending UA with C&S result at this time  "

## 2023-12-19 NOTE — ASSESSMENT & PLAN NOTE
Patient denies any  related concerns on this visit  Somnolent with low grade fever  With pending UA with C&S result at this time - check UTI  V/S daily x 5 days

## 2023-12-19 NOTE — ASSESSMENT & PLAN NOTE
Patient with progressive decline since recent COVID-19 infection on November, 2023  Somnolent today - unable to recall name/birthday on assessment  CBC with diff and CMP: WNL except for mildly low TPro level: 6.2  Continue Donepezil 10mg at HS

## 2023-12-19 NOTE — PROGRESS NOTES
"[facility]  [address] [place] [address], [address], [place] [address] [phone]    NAME: Roque Adhikari  AGE: 83 y.o. SEX: male    Progress Note    Location: [facility]  POS: 13    Assessment/Plan:    Lethargy  Somnolence and \" not himself\" x 2 days  Low grade fever : T99.0F this morning  Physical examination: unremarkable  STAT labs (12/19/2023): No leukocytosis with differentials WNL. No anemia. Renal and hepatic functions WNL  V/S daily x 5 days  Nursing to continue to monitor for new or worsening symptoms.  Pending UA with C&S result at this time    BPH with obstruction/lower urinary tract symptoms  Patient denies any  related concerns on this visit  Somnolent with low grade fever  With pending UA with C&S result at this time - check UTI  V/S daily x 5 days    Late onset Alzheimer's dementia without behavioral disturbance (HCC)  Patient with progressive decline since recent COVID-19 infection on November, 2023  Somnolent today - unable to recall name/birthday on assessment  CBC with diff and CMP: WNL except for mildly low TPro level: 6.2  Continue Donepezil 10mg at HS      Chief complaint / Reason for visit:  Acute Visit    History of Present Illness:  This is an 83-year-old male patient residing at [facility].  Patient is seen and examined today per nursing request for being too sleepy this morning -needing assistance with meals (ate only 25-50% this morning) and ambulation and a low grade fever: T99.0F.  Per nursing, patient is described \"as not himself\" since yesterday but worse today. Nursing also received report from 11p-7A shift that patient did not sleep well last night.    Patient is out of bed for this visit, sitting in recliner in room-well-dressed.  Patient is initially sound asleep -easily awakened when name is called but continues to be sleepy throughout the duration of this visit.  Patient is verbal with clear speech - but unable to recall name/birthday, \" I don't " "know\". Patient is cooperative, calm and not in distress or toxic looking. Patient acknowledged feeling well on this visit, \" I'm very good\" -denies any acute medical concerns during ROS assessment including pain.  Physical assessment of this visit is unremarkable. Ordered STAT labs: CBC with diff, CMP and UA with C&S.  Results for CBC and CMP reviewed: WNL except for low TProtein level only. Pending UA results at this time. Nursing to continue to monitor at this time.    Called and spoken to daughter - discussed reason for visit/ findings/ lab results/ plan of care - agreeable.    Nursing and prior provider notes reviewed on this visit. Discussed visit with PCP and nursing staff/ supervisor.    Review of Systems:  Per history of present illness, all other systems reviewed and negative.    HISTORY:  Medical Hx: Reviewed, unchanged  Family Hx: Reviewed, unchanged  Soc Hx: Reviewed,  unchanged    ALLERGY: Reviewed, unchanged. No Known Allergies     PHYSICAL EXAM:  Vital Signs: T99.0F -HR 85 -R16 -BP: 120/75 -SpO2 94% RA  Weight 210.9 lbs (12/14/2023)    General: Well appearing. No acute distress. Somnolent  Head: Atraumatic. Normocephalic.  Eye Exam: anicteric sclera, no discharge, PERRLA, No injection. Wears glasses.  Oral Exam: moist mucous membranes, no buccaloropharyngeal erythema, palatine tonsils WNL.  Neck Exam: no anterior cervical lymphadenopathy noted, neck supple. Trachea midline, no carotid bruit, no masses  Nose: erythema to nasal mucosa, no rhinorrhea  Cardiovascular: regular rate, regular rhythm, no: murmurs/ rubs/ gallops. S1 and S2 appreciated.  Pulmonary: no wheeze, no rhonchi, no rales. No chest tenderness. Normal chest wall expansion  Abdominal: soft, non-tender, nondistended, bowel sounds audible x 4 quadrants. No palpable hepatosplenomegaly, no tympany  : Non distended bladder. Continent.  Extremities and skin: no edema noted, no rashes. Intact skin  Neurological: alert, cooperative and " "responsive, Oriented x 0. No tics, normal sensation to pressure and light touch.  moving all 4 extremities symmetrically. Ambulatory dysfunction - uses walker.    Laboratory / Imaging results reviewed.    Current Medications: All medications reviewed and updated in Nursing Home eMAR.    Please note: This note was completed in part utilizing a voice-recognition software may have been used in the preparation of this document. Grammatical errors, random word insertion, spelling mistakes, and incomplete sentences may be an occasional consequence of the system secondary to software limitations, ambient noise and hardware issues. Occasional wrong word or \"sound-alike\" substitutions may have occurred due to the inherent limitations of voice recognition software. At the time of dictation, efforts were made to edit, clarify and/or correct errors. Interpretation should be guided by context. Please read the chart carefully and recognize, using context, where substitutions have occurred. If you have any questions or concerns about the context, text or information contained within the body of this dictation, please contact myself, the provider, for further clarification.      SHYAM Garcia  12/19/2023  "

## 2024-01-11 ENCOUNTER — IN HOME VISIT (OUTPATIENT)
Dept: GERIATRICS | Facility: OTHER | Age: 84
End: 2024-01-11
Payer: MEDICARE

## 2024-01-11 DIAGNOSIS — N13.8 BPH WITH OBSTRUCTION/LOWER URINARY TRACT SYMPTOMS: ICD-10-CM

## 2024-01-11 DIAGNOSIS — F02.80 LATE ONSET ALZHEIMER'S DEMENTIA WITHOUT BEHAVIORAL DISTURBANCE (HCC): ICD-10-CM

## 2024-01-11 DIAGNOSIS — S51.819A SKIN TEAR OF FOREARM WITHOUT COMPLICATION, INITIAL ENCOUNTER: ICD-10-CM

## 2024-01-11 DIAGNOSIS — R50.9 FEVER, UNSPECIFIED FEVER CAUSE: Primary | ICD-10-CM

## 2024-01-11 DIAGNOSIS — N40.1 BPH WITH OBSTRUCTION/LOWER URINARY TRACT SYMPTOMS: ICD-10-CM

## 2024-01-11 DIAGNOSIS — G30.1 LATE ONSET ALZHEIMER'S DEMENTIA WITHOUT BEHAVIORAL DISTURBANCE (HCC): ICD-10-CM

## 2024-01-11 DIAGNOSIS — G47.33 OBSTRUCTIVE SLEEP APNEA: ICD-10-CM

## 2024-01-11 PROCEDURE — 99348 HOME/RES VST EST LOW MDM 30: CPT | Performed by: NURSE PRACTITIONER

## 2024-01-11 NOTE — PROGRESS NOTES
"[address], [address], [place] [address]  [phone]    NAME: Roque Adhikari  AGE: 83 y.o. SEX: male    Progress Note    Location: [facility])  POS: 13    Assessment/Plan:    Fever  Sudden onset this morning: T101.F  (+) lethargy,somnolence, hypoxia: 88% RA  LTCA. Elevated HR: 93-95/min  STAT infectious work-up today:  CXR (2 views)  Labs: CBC with diff, CMP, UA w/ C&S  Respiratory Panel  V/S daily x 5 days  Start O2 supplement to keep SpO2 > 93%    BPH with obstruction/lower urinary tract symptoms  Patient denies any  related concerns  Check UA with C&S to rule out UTI  Continue to encourage oral fluids as tolerated    Obstructive sleep apnea  Not on CPAP/BiPaP  Low SpO2 today: 88% RA  No evident respiratory symptoms or findings  Start O2 to keep Spo2 > 93%  Has pending STAT CXR today    Late onset Alzheimer's dementia without behavioral disturbance (HCC)  Somnolent today  With Fever: T101.0F  Has pending infectious work-up today  Continue [facility] supportive care    Skin tear of forearm without complication, initial encounter  Hx of recurrent falls  Sustained skin tear to Right UE: dry and no signs of infection on assessment  Continue dressing changes    Chief complaint / Reason for visit: Acute Visit    History of Present Illness:  This is an 83-year-old male patient residing at [facility].  Patient is seen and examined today per nursing request for fever: T101.0F, lethargy/ somnolence.  Per nursing, patient also was found to have rolled out of bed this morning, did not eat breakfast but wanted to stay in bed.    Patient is in bed for this visit -asleep but was easily awakened when name is called -cooperative, calm, pleasant and not in distress.  Patient requires repetition of assessment due to somnolence.  Patient denies any acute medical concerns at this visit, \" I'm fine\".  Patient denies shortness of breath or difficulty breathing, chills or fever, dysuria " or discomfort during voiding, denies chest pain, headache, dizziness/vertigo.  Patient feels warm to touch. Low SpO2:   88% RA with elevated HR above baseline: 93-95/min.  Patient unable to maintain full wakefulness on this visit.  Nursing gave patient 1 dose of as needed Tylenol for fever with relief.  Will do infectious workup stat today.  Nursing to continue to monitor new or worsening symptoms.    Called and spoken to patient's POA -discussed reason for visit/findings/plan of care -daughter agreeable.  Discussed with daughter that patient worsens prior to receiving all infectious workup results if hospitalization is something that she is agreeable to.  Daughter is reluctant to send patient to the hospital and would prefer to have patient manage in [facility] or in the [place] for skilled nursing services.     Nursing and prior provider notes reviewed on this visit. Discussed visit with PCP and nursing staff/ supervisor.    Review of Systems:  Per history of present illness, all other systems reviewed and negative.    HISTORY:  Medical Hx: Reviewed, unchanged  Family Hx: Reviewed, unchanged  Soc Hx: Reviewed,  unchanged    ALLERGY: Reviewed, unchanged. No Known Allergies     PHYSICAL EXAM:  Vital Signs: T 101.0F -HR 95 -R 16 -BP: 134/82 -SpO2 88% RA  Weight: 213.6 lbs (1/4/2024) <= 208.0 lbs (12/7/2023) <= 216.6 lbs (11/2/2023)    General: Lethargic/ somnolenve. No acute distress. Ill appearing  Head: Atraumatic. Normocephalic.  Eye Exam: anicteric sclera, no discharge, PERRLA, No injection  Oral Exam: moist mucous membranes, no buccaloropharyngeal erythema, palatine tonsils WNL.  Neck Exam: no anterior cervical lymphadenopathy noted, neck supple. Trachea midline, no carotid bruit, no masses  Cardiovascular: elevated HR, regular rhythm, no: murmurs/ rubs/ gallops. S1 and S2 appreciated.  Pulmonary: no wheeze, no rhonchi, no rales. No chest tenderness. Normal chest wall expansion  Abdominal: soft, non-tender, nondistended,  "bowel sounds audible x 4 quadrants. No palpable hepatosplenomegaly, no tympany  : Non distended bladder. Continent.  Extremities and skin: no edema noted, no rashes. Intact skin  Neurological: somnolent, cooperative and responsive. No tics, normal sensation to pressure and light touch.  moving all 4 extremities symmetrically.     Laboratory / Imaging results reviewed.    Current Medications: All medications reviewed and updated in Nursing Home eMAR.    Please note: This note was completed in part utilizing a voice-recognition software may have been used in the preparation of this document. Grammatical errors, random word insertion, spelling mistakes, and incomplete sentences may be an occasional consequence of the system secondary to software limitations, ambient noise and hardware issues. Occasional wrong word or \"sound-alike\" substitutions may have occurred due to the inherent limitations of voice recognition software. At the time of dictation, efforts were made to edit, clarify and/or correct errors. Interpretation should be guided by context. Please read the chart carefully and recognize, using context, where substitutions have occurred. If you have any questions or concerns about the context, text or information contained within the body of this dictation, please contact myself, the provider, for further clarification.      SHYAM Garcia  1/11/2024  "

## 2024-01-11 NOTE — ASSESSMENT & PLAN NOTE
Sudden onset this morning: T101.F  (+) lethargy,somnolence, hypoxia: 88% RA  LTCA. Elevated HR: 93-95/min  STAT infectious work-up today:  CXR (2 views)  Labs: CBC with diff, CMP, UA w/ C&S  Respiratory Panel  V/S daily x 5 days  Start O2 supplement to keep SpO2 > 93%

## 2024-01-11 NOTE — ASSESSMENT & PLAN NOTE
Somnolent today  With Fever: T101.0F  Has pending infectious work-up today  Continue [facility] supportive care

## 2024-01-11 NOTE — ASSESSMENT & PLAN NOTE
Patient denies any  related concerns  Check UA with C&S to rule out UTI  Continue to encourage oral fluids as tolerated

## 2024-01-11 NOTE — ASSESSMENT & PLAN NOTE
Hx of recurrent falls  Sustained skin tear to Right UE: dry and no signs of infection on assessment  Continue dressing changes

## 2024-01-11 NOTE — ASSESSMENT & PLAN NOTE
Not on CPAP/BiPaP  Low SpO2 today: 88% RA  No evident respiratory symptoms or findings  Start O2 to keep Spo2 > 93%  Has pending STAT CXR today

## 2024-01-12 ENCOUNTER — IN HOME VISIT (OUTPATIENT)
Dept: GERIATRICS | Facility: OTHER | Age: 84
End: 2024-01-12
Payer: MEDICARE

## 2024-01-12 DIAGNOSIS — R50.9 FEVER, UNSPECIFIED FEVER CAUSE: Primary | ICD-10-CM

## 2024-01-12 LAB — EXT SARS-COV-2: NOT DETECTED

## 2024-01-12 PROCEDURE — 99349 HOME/RES VST EST MOD MDM 40: CPT | Performed by: NURSE PRACTITIONER

## 2024-01-12 NOTE — ASSESSMENT & PLAN NOTE
(+) Leukocytosis of undetermined etiology at this time  Patient received 1 dose of Ceftriaxone 1G IM on 1/11/2024  = CXR (2 views: 1/11/2024): Negative  = Respiratory panel (1/12/2024): Negative  Improved WBC count today (1/12/2024): 14.2 (H) <= 19.4 (H: 1/11/2024)  Improved Ab Neutrophil (1/12/2024): 11.7 (H) <= 16.4 (H: 1/11/2024)  Improved Ab Mono count (1/12/2024): 1.1 (H) <= 1.4 (H: 1/11/2024)  Patient more alert and engaged  Patient is oriented x 3 (name/birthday/place) today  Start Cefpodoxime 200mg Q12 hours x 5 days  Start Probiotic TID x 10 days  Pending UA with C&S result  Continue V/S daily x 5 days  Continue to assist patient with meals and oral fluids  Encourage oral fluids as tolerated  Repeat CBC with diff and BMP on 1/15/2024

## 2024-01-12 NOTE — PROGRESS NOTES
[facility]  [address] [place] [address], [address], [place] [address] [phone]    NAME: Roque Adhikari  AGE: 83 y.o. SEX: male    Progress Note    Location: [facility]  POS: 13    Assessment/Plan:    Fever  (+) Leukocytosis of undetermined etiology at this time  Patient received 1 dose of Ceftriaxone 1G IM on 1/11/2024  = CXR (2 views: 1/11/2024): Negative  = Respiratory panel (1/12/2024): Negative  Improved WBC count today (1/12/2024): 14.2 (H) <= 19.4 (H: 1/11/2024)  Improved Ab Neutrophil (1/12/2024): 11.7 (H) <= 16.4 (H: 1/11/2024)  Improved Ab Mono count (1/12/2024): 1.1 (H) <= 1.4 (H: 1/11/2024)  Patient more alert and engaged  Patient is oriented x 3 (name/birthday/place) today  Start Cefpodoxime 200mg Q12 hours x 5 days  Start Probiotic TID x 10 days  Pending UA with C&S result  Continue V/S daily x 5 days  Continue to assist patient with meals and oral fluids  Encourage oral fluids as tolerated  Repeat CBC with diff and BMP on 1/15/2024      Chief complaint / Reason for visit: Follow- visit    History of Present Illness:  This is an 83-year-old male patient residing at [facility].  Patient is seen and examined today for follow-up of acute medical conditions initially managed yesterday: Fever/ lethargy/ Leukocytosis.  Patient is in bed for this visit -Per nursing, patient recently placed back to bed after lunchtime.  Patient continues with fatigue presentation but improved mentation and orientation: Oriented to name/birthday/current place but unable to recall current date.  Patient is verbal with clear speech -denies any acute medical concerns during ROS assessment including pain. 2 nursing staff present on this visit -collected urine specimen for submission to lab: dark shanna colored urine.  Reviewed lab results today: Respiratory panel is negative -WBC is trending down -absolute neutrophils and monocytes trending down.  Renal functions WNL but with slightly elevated BUN.   Reminded nursing to assist patient with oral fluids -encourage as tolerated.  Patient continues with low grade fever: T99.2F today.  Per nursing, patient ate meals at about 25-50% completion today.  Will start broad-spectrum oral antibiotic.  Will adjust per labs results and UA with C&S results.    Called and update patient's POA today.    Nursing and prior provider notes reviewed on this visit. Discussed visit with PCP and nursing staff/ supervisor.    Review of Systems:  Per history of present illness, all other systems reviewed and negative.    HISTORY:  Medical Hx: Reviewed, unchanged  Family Hx: Reviewed, unchanged  Soc Hx: Reviewed,  unchanged    ALLERGY: Reviewed, unchanged. No Known Allergies     PHYSICAL EXAM:  Vital Signs: T 99.2F -HR 84 -R 16 -BP: 104/72 -SpO2 94% RA  Weight: 208.0 lbs (1/11/2024)    General: more alert and engaged. No acute distress. Fatigued presentation  Head: Atraumatic. Normocephalic.  Eye Exam: anicteric sclera, no discharge, PERRLA, No injection  Oral Exam: moist mucous membranes, no buccaloropharyngeal erythema, palatine tonsils WNL.  Neck Exam: no anterior cervical lymphadenopathy noted, neck supple. Trachea midline, no carotid bruit, no masses  Cardiovascular: regular rate, regular rhythm, no: murmurs/ rubs/ gallops. S1 and S2 appreciated.  Pulmonary: no wheeze, no rhonchi, no rales. No chest tenderness. Normal chest wall expansion  Abdominal: soft, non-tender, nondistended, bowel sounds audible x 4 quadrants. No palpable hepatosplenomegaly, no tympany  : Non distended bladder. Continent.  Extremities and skin: no edema noted, no rashes. Intact skin  Neurological: alert, cooperative and responsive, Oriented x 3. No tics, normal sensation to pressure and light touch.  moving all 4 extremities symmetrically    Laboratory / Imaging results reviewed.    Current Medications: All medications reviewed and updated in Nursing Home eMAR.    Please note: This note was completed in part  "utilizing a voice-recognition software may have been used in the preparation of this document. Grammatical errors, random word insertion, spelling mistakes, and incomplete sentences may be an occasional consequence of the system secondary to software limitations, ambient noise and hardware issues. Occasional wrong word or \"sound-alike\" substitutions may have occurred due to the inherent limitations of voice recognition software. At the time of dictation, efforts were made to edit, clarify and/or correct errors. Interpretation should be guided by context. Please read the chart carefully and recognize, using context, where substitutions have occurred. If you have any questions or concerns about the context, text or information contained within the body of this dictation, please contact myself, the provider, for further clarification.      SHYAM Garcia  1/12/2024  "

## 2024-01-14 ENCOUNTER — TELEPHONE (OUTPATIENT)
Dept: OTHER | Facility: OTHER | Age: 84
End: 2024-01-14

## 2024-01-14 NOTE — TELEPHONE ENCOUNTER
Maida from [facility] called to report that pt has been sick for a few days and has been spiking fevers. Maida would like to speak to on call provider about family members wanting to take pt to hospital. Paged on call provider via tc

## 2024-01-15 PROBLEM — Z00.00 MEDICARE ANNUAL WELLNESS VISIT, INITIAL: Status: RESOLVED | Noted: 2023-11-16 | Resolved: 2024-01-15

## 2024-01-22 ENCOUNTER — NURSING HOME VISIT (OUTPATIENT)
Dept: GERIATRICS | Facility: OTHER | Age: 84
End: 2024-01-22
Payer: MEDICARE

## 2024-01-22 DIAGNOSIS — R26.2 AMBULATORY DYSFUNCTION: ICD-10-CM

## 2024-01-22 DIAGNOSIS — F02.80 LATE ONSET ALZHEIMER'S DEMENTIA WITHOUT BEHAVIORAL DISTURBANCE (HCC): ICD-10-CM

## 2024-01-22 DIAGNOSIS — M54.50 CHRONIC LOW BACK PAIN, UNSPECIFIED BACK PAIN LATERALITY, UNSPECIFIED WHETHER SCIATICA PRESENT: ICD-10-CM

## 2024-01-22 DIAGNOSIS — J18.9 PNEUMONIA OF BOTH LOWER LOBES DUE TO INFECTIOUS ORGANISM: Primary | ICD-10-CM

## 2024-01-22 DIAGNOSIS — G30.1 LATE ONSET ALZHEIMER'S DEMENTIA WITHOUT BEHAVIORAL DISTURBANCE (HCC): ICD-10-CM

## 2024-01-22 DIAGNOSIS — N13.8 BPH WITH OBSTRUCTION/LOWER URINARY TRACT SYMPTOMS: ICD-10-CM

## 2024-01-22 DIAGNOSIS — M15.9 GENERALIZED OSTEOARTHRITIS: ICD-10-CM

## 2024-01-22 DIAGNOSIS — L89.40: ICD-10-CM

## 2024-01-22 DIAGNOSIS — A41.9 SEPSIS WITHOUT ACUTE ORGAN DYSFUNCTION, DUE TO UNSPECIFIED ORGANISM (HCC): ICD-10-CM

## 2024-01-22 DIAGNOSIS — G93.41 ACUTE METABOLIC ENCEPHALOPATHY: ICD-10-CM

## 2024-01-22 DIAGNOSIS — N40.1 BPH WITH OBSTRUCTION/LOWER URINARY TRACT SYMPTOMS: ICD-10-CM

## 2024-01-22 DIAGNOSIS — G89.29 CHRONIC LOW BACK PAIN, UNSPECIFIED BACK PAIN LATERALITY, UNSPECIFIED WHETHER SCIATICA PRESENT: ICD-10-CM

## 2024-01-22 PROBLEM — I73.9 PAD (PERIPHERAL ARTERY DISEASE) (HCC): Status: ACTIVE | Noted: 2022-09-29

## 2024-01-22 PROBLEM — R35.1 NOCTURIA: Status: ACTIVE | Noted: 2017-07-27

## 2024-01-22 PROBLEM — G92.9 ENCEPHALOPATHY, TOXIC: Status: ACTIVE | Noted: 2024-01-16

## 2024-01-22 PROCEDURE — 99306 1ST NF CARE HIGH MDM 50: CPT | Performed by: INTERNAL MEDICINE

## 2024-01-22 RX ORDER — LIDOCAINE 50 MG/G
1 PATCH TOPICAL DAILY
COMMUNITY

## 2024-01-22 RX ORDER — MULTIVIT-MIN/IRON FUM/FOLIC AC 7.5 MG-4
1 TABLET ORAL DAILY
COMMUNITY

## 2024-01-22 NOTE — PROGRESS NOTES
[facility] Nursing home notes  SHORT TERM REHAB       NAME: Roque Adhikari  AGE: 83 y.o. SEX: male    DATE OF ENCOUNTER: 2024    Assessment and Plan   Bilateral pneumonia  Treated with IV antibiotics  Continue monitoring symptoms  Continue to encourage deep breaths  Continue monitoring vitals     Late onset Alzheimer's dementia without behavioral disturbance (HCC)  Continue current meds  Continue with supportive care  Continue with safety measures  Continue with fall precautions      Ambulatory dysfunction  Multifactorial  Continue with safety measures  Continue with fall precautions  PT/OT eval and treat     BPH with obstruction/lower urinary tract symptoms  Continue current meds  Continue monitoring symptoms     Acute metabolic encephalopathy  Secondary to infection and fever  Seems to be have improved now  Continue with current meds  Continue encouraging oral intake     Generalized osteoarthritis  Continue tylenol   Continue monitoring symptoms     Low back pain  Better today as per patient  Continue current meds  Continue with activity as tolerable     Sepsis without acute organ dysfunction (HCC)  Seems to be have improved  Seems to have resolved with hospital treatment  Presumed to be due to pneumonia     Pressure injury of skin of  cleft  Continue with wound care  Continue to off load weight as much as possible  Continue follow up with wound           Chief Complaint     Back pain better     History of Present Illness     84 yo male seen for admission to [facility] after hospitalization. Patient unable to give history due to his dementia. History obtained from daughter. As per daughter patient has been feeling ill for week or so prior to admission to the hospital. She reports he was becoming somnolent and sleeping all the time. She started to spike a fever and they started to treat him for infection. But patient continued decline and she reported that he also had a fall prior to it. Since  he was continued to get worst he was sent to the hospital. At the hospital they did full work up for infection and could not really find exact cause in the beginning. He presumed to have pneumonia and treated for it but during his treatment he did have continue encephalopathy and had his antibiotics adjusted. He also was dehydrated due to not eating or drinking well so he was given IV fluids. Once his symptoms improved he was discharged to [facility] for therapy. Reviewed labs and imaging reports from the hospital records.     PMHx     Past Medical History:   Diagnosis Date    Acute prostatitis     Arthritis     BPH with obstruction/lower urinary tract symptoms     Cervicalgia     Dementia (HCC)     Dysuria     Erectile dysfunction     Feeling of incomplete bladder emptying     Frequency of urination     Nocturia     Urinary retention      Past Surgical History:   Procedure Laterality Date    CATARACT EXTRACTION      HERNIA REPAIR  1998    HIP SURGERY  2019     Family History   Problem Relation Age of Onset    Pneumonia Mother     Heart attack Father      Social History     Socioeconomic History    Marital status: /Civil Union     Spouse name: None    Number of children: None    Years of education: None    Highest education level: None   Occupational History    None   Tobacco Use    Smoking status: Former     Current packs/day: 0.00     Types: Cigarettes     Quit date: 1972     Years since quittin.0    Smokeless tobacco: Never   Vaping Use    Vaping status: Never Used   Substance and Sexual Activity    Alcohol use: Yes    Drug use: No    Sexual activity: None   Other Topics Concern    None   Social History Narrative    None     Social Determinants of Health     Financial Resource Strain: Low Risk  (1/15/2024)    Received from [facility]    Overall Financial Resource Strain (CARDIA)     Difficulty of Paying Living Expenses: Not hard at all   Food Insecurity: No Food  Insecurity (1/15/2024)    Received from [facility]    Hunger Vital Sign     Worried About Running Out of Food in the Last Year: Never true     Ran Out of Food in the Last Year: Never true   Transportation Needs: No Transportation Needs (1/15/2024)    Received from [facility]    PRAPARE - Transportation     Lack of Transportation (Medical): No     Lack of Transportation (Non-Medical): No   Physical Activity: Not on file   Stress: Not on file   Social Connections: Not on file   Intimate Partner Violence: Not At Risk (1/15/2024)    Received from [facility]    Humiliation, Afraid, Rape, and Kick questionnaire     Fear of Current or Ex-Partner: No     Emotionally Abused: No     Physically Abused: No     Sexually Abused: No   Housing Stability: Low Risk  (1/15/2024)    Received from [facility]    Housing Stability Vital Sign     Unable to Pay for Housing in the Last Year: No     Number of Places Lived in the Last Year: 1     Unstable Housing in the Last Year: No     No Known Allergies    Review of Systems     Back pain but better  Tired   All other review of system negative        Objective   Vital signs:  BP: 116/75  HR: 77  RR: 16  TEMP: 97.6F  SAT.: 94% on RA    PHYSICAL EXAM:  GENERAL: no acute distress  SKIN: warm, dry, no rash, no cyanosis  HEENT: normocephalic, atraumatic, no JVD, no Thyromegaly, no lymphadenopathy  LUNGS: CTA, no wheezing, no rales, expanded equally, no chest tenderness   HEART: normal rhythm, normal rate, no murmur, no gallop  ABDOMEN: soft non tender non distended bs+, no guarding or rebound tenderness  :  no suprapubic tenderness  MUSCULOSKELETAL: strength about 4+/5 all extremities, ROM within normal, no calf tenderness  NEUROLOGY: awake, alert, Oriented to person only, unable to recall 3 object. CN2-12 intact.   PSYCH: cooperative, pleasant.       Pertinent Laboratory/Diagnostic Studies:  Recent labs and diagnostic  tests reviewed in nursing home EMR    Current Medications   Medications reviewed and signed off on nursing home EMR.

## 2024-01-22 NOTE — ASSESSMENT & PLAN NOTE
Continue with wound care  Continue to off load weight as much as possible  Continue follow up with wound

## 2024-01-22 NOTE — ASSESSMENT & PLAN NOTE
Seems to be have improved  Seems to have resolved with hospital treatment  Presumed to be due to pneumonia

## 2024-01-22 NOTE — ASSESSMENT & PLAN NOTE
Multifactorial  Continue with safety measures  Continue with fall precautions  PT/OT eval and treat

## 2024-01-22 NOTE — ASSESSMENT & PLAN NOTE
Secondary to infection and fever  Seems to be have improved now  Continue with current meds  Continue encouraging oral intake

## 2024-01-22 NOTE — ASSESSMENT & PLAN NOTE
Treated with IV antibiotics  Continue monitoring symptoms  Continue to encourage deep breaths  Continue monitoring vitals

## 2024-01-22 NOTE — ASSESSMENT & PLAN NOTE
Continue current meds  Continue with supportive care  Continue with safety measures  Continue with fall precautions

## 2024-01-25 DIAGNOSIS — M54.50 CHRONIC LOW BACK PAIN, UNSPECIFIED BACK PAIN LATERALITY, UNSPECIFIED WHETHER SCIATICA PRESENT: Primary | ICD-10-CM

## 2024-01-25 DIAGNOSIS — G89.29 CHRONIC LOW BACK PAIN, UNSPECIFIED BACK PAIN LATERALITY, UNSPECIFIED WHETHER SCIATICA PRESENT: Primary | ICD-10-CM

## 2024-01-25 RX ORDER — TRAMADOL HYDROCHLORIDE 50 MG/1
25 TABLET ORAL EVERY 8 HOURS PRN
Qty: 30 TABLET | Refills: 0 | Status: SHIPPED | OUTPATIENT
Start: 2024-01-25

## 2024-01-29 ENCOUNTER — NURSING HOME VISIT (OUTPATIENT)
Dept: GERIATRICS | Facility: OTHER | Age: 84
End: 2024-01-29
Payer: MEDICARE

## 2024-01-29 ENCOUNTER — NURSING HOME VISIT (OUTPATIENT)
Dept: WOUND CARE | Facility: HOSPITAL | Age: 84
End: 2024-01-29
Payer: MEDICARE

## 2024-01-29 DIAGNOSIS — M54.50 CHRONIC LOW BACK PAIN, UNSPECIFIED BACK PAIN LATERALITY, UNSPECIFIED WHETHER SCIATICA PRESENT: ICD-10-CM

## 2024-01-29 DIAGNOSIS — L89.322 PRESSURE INJURY OF LEFT BUTTOCK, STAGE 2 (HCC): ICD-10-CM

## 2024-01-29 DIAGNOSIS — R26.2 AMBULATORY DYSFUNCTION: Primary | ICD-10-CM

## 2024-01-29 DIAGNOSIS — L89.312 PRESSURE INJURY OF RIGHT BUTTOCK, STAGE 2 (HCC): ICD-10-CM

## 2024-01-29 DIAGNOSIS — G89.29 CHRONIC LOW BACK PAIN, UNSPECIFIED BACK PAIN LATERALITY, UNSPECIFIED WHETHER SCIATICA PRESENT: ICD-10-CM

## 2024-01-29 DIAGNOSIS — G30.1 LATE ONSET ALZHEIMER'S DEMENTIA WITHOUT BEHAVIORAL DISTURBANCE (HCC): ICD-10-CM

## 2024-01-29 DIAGNOSIS — F02.80 LATE ONSET ALZHEIMER'S DEMENTIA WITHOUT BEHAVIORAL DISTURBANCE (HCC): ICD-10-CM

## 2024-01-29 PROCEDURE — 99309 SBSQ NF CARE MODERATE MDM 30: CPT | Performed by: INTERNAL MEDICINE

## 2024-01-29 PROCEDURE — 99305 1ST NF CARE MODERATE MDM 35: CPT | Performed by: NURSE PRACTITIONER

## 2024-01-29 RX ORDER — ACETAMINOPHEN 500 MG
1000 TABLET ORAL 2 TIMES DAILY
COMMUNITY

## 2024-01-29 NOTE — ASSESSMENT & PLAN NOTE
Cognition slightly better  But still sleeps at times  Continue with supportive care  Continue with safety measures  Continue with fall precautions

## 2024-01-29 NOTE — ASSESSMENT & PLAN NOTE
Better now  Also contribution from the wound which has been getting better  Continue current meds

## 2024-01-29 NOTE — LETTER
Patient:  Roque Adhikari   1940           SHYAM Moulton saw Roque Adhikari for a wound care visit on 1/29/2024. See below for information relating to this visit.      Chief Complaint   Patient presents with    New Patient Visit     buttocks        Assessment/Plan:  1. Ambulatory dysfunction  Assessment & Plan:  On 24/7 restorative care      2. Pressure injury of right buttock, stage 2 (HCC)  Assessment & Plan:  Right buttock  Right buttock: Wound size is 1 x 1 x 0.1 cm.,  100% partial-thickness, periwound is dry, no drainage, no obvious sign of infection    Assessment and plan  Wound improved compared to last week measurement, 100% partial-thickness  Local wound care with Triad and bordered foam  Continue to offload patient is incontinent  Provide timely continence care  Patient currently on low-air-loss mattress and pressure relief wheelchair cushion  Increase protein intake  Provided update to daughter. All questions answered  Follow up next week      3. Pressure injury of left buttock, stage 2 (HCC)  Assessment & Plan:  Left buttock  Wound is healed             Orders:  Roque Adhikari  1940  Wound: Right buttock  Discontinue previous wound order  Cleanse the wound bed with NSS   Apply non-sting skin prep to periwound area  Apply Triad paste to wound bed, then cover with Bordered foam   Frequency : daily and prn for soiling  Offload all wounds  Turn and reposition frequently  Instruct / Assist with weight shifting in wheelchair  Increase protein intake.  Monitor for any sign of infection or worsening, inform PCP or patient's primary physician in your facility.      Follow Up:  Return in about 10 days (around 2/8/2024).       [facility] hours are 8:00 am - 4:30 pm Monday through Friday. The center phone number is [phone]. You can also contact me directly thru my email at [email or web address] or thru tiger text. If it is an emergency, please contact the  PCP or patient's attending physician in your facility.     Sincerely,    Electronically signed by SHYAM Moulton    Patient : Roque Adhikari    1940

## 2024-01-29 NOTE — PROGRESS NOTES
[facility] home notes  SHORT TERM REHAB       NAME: Roque Adhikari  AGE: 83 y.o. SEX: male    DATE OF ENCOUNTER: 2024    Assessment and Plan   Ambulatory dysfunction  Multifactorial  Continues to work with therapy   Reviewed therapy notes  Discussed with therapy about patient     Late onset Alzheimer's dementia without behavioral disturbance (HCC)  Cognition slightly better  But still sleeps at times  Continue with supportive care  Continue with safety measures  Continue with fall precautions     Low back pain  Better now  Also contribution from the wound which has been getting better  Continue current meds         Chief Complaint     No new complaints    History of Present Illness     84 yo male seen for follow up. Patient seen for ambulatory dysfunction, dementia and low back pain. Reviewed nursing notes since last visit.    PMHx     Past Medical History:   Diagnosis Date    Acute prostatitis     Arthritis     BPH with obstruction/lower urinary tract symptoms     Cervicalgia     Dementia (HCC)     Dysuria     Erectile dysfunction     Feeling of incomplete bladder emptying     Frequency of urination     Nocturia     Urinary retention      Past Surgical History:   Procedure Laterality Date    CATARACT EXTRACTION      HERNIA REPAIR  1998    HIP SURGERY  2019     Family History   Problem Relation Age of Onset    Pneumonia Mother     Heart attack Father      Social History     Socioeconomic History    Marital status: /Civil Union     Spouse name: Not on file    Number of children: Not on file    Years of education: Not on file    Highest education level: Not on file   Occupational History    Not on file   Tobacco Use    Smoking status: Former     Current packs/day: 0.00     Types: Cigarettes     Quit date: 1972     Years since quittin.1    Smokeless tobacco: Never   Vaping Use    Vaping status: Never Used   Substance and Sexual Activity    Alcohol use: Yes    Drug use: No    Sexual  activity: Not on file   Other Topics Concern    Not on file   Social History Narrative    Not on file     Social Determinants of Health     Financial Resource Strain: Low Risk  (1/15/2024)    Received from [facility]    Overall Financial Resource Strain (CARDIA)     Difficulty of Paying Living Expenses: Not hard at all   Food Insecurity: No Food Insecurity (1/15/2024)    Received from [facility]    Hunger Vital Sign     Worried About Running Out of Food in the Last Year: Never true     Ran Out of Food in the Last Year: Never true   Transportation Needs: No Transportation Needs (1/15/2024)    Received from [facility]    PRAPARE - Transportation     Lack of Transportation (Medical): No     Lack of Transportation (Non-Medical): No   Physical Activity: Not on file   Stress: Not on file   Social Connections: Not on file   Intimate Partner Violence: Not At Risk (1/15/2024)    Received from [facility]    Humiliation, Afraid, Rape, and Kick questionnaire     Fear of Current or Ex-Partner: No     Emotionally Abused: No     Physically Abused: No     Sexually Abused: No   Housing Stability: Low Risk  (1/15/2024)    Received from [facility]    Housing Stability Vital Sign     Unable to Pay for Housing in the Last Year: No     Number of Places Lived in the Last Year: 1     Unstable Housing in the Last Year: No     No Known Allergies    Review of Systems     Denies any pain   All other review of system negative        Objective   Vital signs reviewed from facility records.     PHYSICAL EXAM:  GENERAL: no acute distress  SKIN: warm, dry, no rash, no cyanosis  HEENT: normocephalic, atraumatic, no JVD, no Thyromegaly, no lymphadenopathy  LUNGS: CTA, no wheezing, no rales, expanded equally, no chest tenderness   HEART: normal rhythm, normal rate, no murmur, no gallop  ABDOMEN: soft non tender non distended bs+, no guarding or rebound  tenderness  :  no suprapubic tenderness  MUSCULOSKELETAL: strength about 4+/5 all extremities, no calf tenderness  NEUROLOGY: awake, alert, CN2-12 intact.   PSYCH: cooperative, pleasant.       Pertinent Laboratory/Diagnostic Studies:  Recent labs and diagnostic tests reviewed in nursing home EMR    Current Medications   Medications reviewed and signed off on nursing home EMR.

## 2024-01-29 NOTE — PROGRESS NOTES
[facility] WOUND CARE MANAGEMENT   AND HYPERBARIC MEDICINE CENTER       Patient ID: Roque Adhikari is a 83 y.o. male Date of Birth 1940     Location of Service:[facility]    Performed wound round with: Wound team     Chief Complaint : Buttocks    Wound Instructions:  Wound: Right buttock  Discontinue previous wound order  Cleanse the wound bed with NSS   Apply non-sting skin prep to periwound area  Apply Triad paste to wound bed, then cover with Bordered foam   Frequency : daily and prn for soiling  Offload all wounds  Turn and reposition frequently  Instruct / Assist with weight shifting in wheelchair  Increase protein intake.  Monitor for any sign of infection or worsening, inform PCP or patient's primary physician in your facility.      Allergies  Patient has no known allergies.      Assessment & Plan:  1. Ambulatory dysfunction  Assessment & Plan:  On 24/7 restorative care      2. Pressure injury of right buttock, stage 2 (HCC)  Assessment & Plan:  Right buttock  Right buttock: Wound size is 1 x 1 x 0.1 cm.,  100% partial-thickness, periwound is dry, no drainage, no obvious sign of infection    Assessment and plan  Wound improved compared to last week measurement, 100% partial-thickness  Local wound care with Triad and bordered foam  Continue to offload patient is incontinent  Provide timely continence care  Patient currently on low-air-loss mattress and pressure relief wheelchair cushion  Increase protein intake  Provided update to daughter. All questions answered  Follow up next week      3. Pressure injury of left buttock, stage 2 (HCC)  Assessment & Plan:  Left buttock  Wound is healed               Subjective:   1/29/2024This is a 83 y.o., male referred to our service for wound/ skin alterations on buttocks.Patient have a complex medical history including but not limited to BPH, RA, and dementia . Patient was referred by Senior Care Team. Patient was seen in collaboration with the facility wound  team.     Wound History: As per report, patient was admitted in the facility with pressure injury on the buttocks.    Received patient in chair, seems comfortable.  Denies pain.  Patient was incontinent of bowel movement.  Patient is wheelchair-bound.  Able to stand with a walker, with minimal assistance.  Current wound treatment is Triad and bordered foam.            Review of Systems   Constitutional: Negative.    Respiratory: Negative.     Cardiovascular: Negative.    Skin:  Positive for wound.       Objective:    Physical Exam  Constitutional:       Appearance: Normal appearance.   Cardiovascular:      Rate and Rhythm: Normal rate.   Pulmonary:      Effort: Pulmonary effort is normal.   Genitourinary:     Comments: incontinent  Musculoskeletal:      Comments: Wheelchair bound  Able to stand with min assistance, with rolling walker   Skin:     Findings: Lesion present.      Comments: Left buttock: Wound is healed, skin is dry    Right buttock: Wound size is 1 x 1 x 0.1 cm.,  100% partial-thickness, periwound is dry, no drainage, no obvious sign of infection   Neurological:      Mental Status: He is alert.              Procedures           Patient's care was coordinated with nursing facility staff. Recent vitals, labs and updated medications were reviewed on EMR or chart system of facility. Past Medical, surgical, social, medication and allergy history and patient's previous records were reviewed and updated as appropriate: Most up-to date information is available in the facility EMR where the patient is currently admitted.    Patient Active Problem List   Diagnosis    Urinary frequency    BPH with obstruction/lower urinary tract symptoms    Cognitive decline    Urge incontinence of urine    Morbid obesity due to excess calories (HCC)    DDD (degenerative disc disease), cervical    Mixed hyperlipidemia    PAD (peripheral artery disease) (HCC)    Spinal stenosis of lumbar region with neurogenic claudication    Low  back pain    Osteoarthritis of left hip    Pain of both hip joints    Spondylosis of lumbosacral joint without myelopathy    Late onset Alzheimer's dementia without behavioral disturbance (HCC)    Primary osteoarthritis of both hips    Ambulatory dysfunction    Vitamin D deficiency    COVID-19 virus detected    Bilateral sacroiliitis (HCC)    Generalized osteoarthritis    Obstructive sleep apnea    Lethargy    Fever    Skin tear of forearm without complication, initial encounter    Bilateral pneumonia    Encephalopathy, toxic    Sepsis without acute organ dysfunction (HCC)    Osteoarthrosis    Nocturia    RBBB    Acute metabolic encephalopathy    Pressure injury of skin of  cleft    Pressure injury of right buttock, stage 2 (HCC)    Pressure injury of left buttock, stage 2 (HCC)     Past Medical History:   Diagnosis Date    Acute prostatitis     Arthritis     BPH with obstruction/lower urinary tract symptoms     Cervicalgia     Dementia (HCC)     Dysuria     Erectile dysfunction     Feeling of incomplete bladder emptying     Frequency of urination     Nocturia     Urinary retention      Past Surgical History:   Procedure Laterality Date    CATARACT EXTRACTION      HERNIA REPAIR  1998    HIP SURGERY  2019     Social History     Socioeconomic History    Marital status: /Civil Union     Spouse name: Not on file    Number of children: Not on file    Years of education: Not on file    Highest education level: Not on file   Occupational History    Not on file   Tobacco Use    Smoking status: Former     Current packs/day: 0.00     Types: Cigarettes     Quit date: 1972     Years since quittin.1    Smokeless tobacco: Never   Vaping Use    Vaping status: Never Used   Substance and Sexual Activity    Alcohol use: Yes    Drug use: No    Sexual activity: Not on file   Other Topics Concern    Not on file   Social History Narrative    Not on file     Social Determinants of Health     Financial Resource  Strain: Low Risk  (1/15/2024)    Received from [facility]    Overall Financial Resource Strain (CARDIA)     Difficulty of Paying Living Expenses: Not hard at all   Food Insecurity: No Food Insecurity (1/15/2024)    Received from [facility]    Hunger Vital Sign     Worried About Running Out of Food in the Last Year: Never true     Ran Out of Food in the Last Year: Never true   Transportation Needs: No Transportation Needs (1/15/2024)    Received from [facility]    PRAPARE - Transportation     Lack of Transportation (Medical): No     Lack of Transportation (Non-Medical): No   Physical Activity: Not on file   Stress: Not on file   Social Connections: Not on file   Intimate Partner Violence: Not At Risk (1/15/2024)    Received from [facility]    Humiliation, Afraid, Rape, and Kick questionnaire     Fear of Current or Ex-Partner: No     Emotionally Abused: No     Physically Abused: No     Sexually Abused: No   Housing Stability: Low Risk  (1/15/2024)    Received from [facility]    Housing Stability Vital Sign     Unable to Pay for Housing in the Last Year: No     Number of Places Lived in the Last Year: 1     Unstable Housing in the Last Year: No        Current Outpatient Medications:     acetaminophen (TYLENOL) 325 mg tablet, Take 650 mg by mouth every 6 (six) hours as needed, Disp: , Rfl:     acetaminophen (TYLENOL) 500 mg tablet, Take 1,000 mg by mouth 2 (two) times a day, Disp: , Rfl:     alfuzosin (UROXATRAL) 10 mg 24 hr tablet, TAKE 1 TABLET BY MOUTH EVERY DAY, Disp: 90 tablet, Rfl: 1    Cholecalciferol (Vitamin D3) 50 MCG (2000 UT) CHEW, Chew 2,000 Units in the morning, Disp: , Rfl:     donepezil (ARICEPT) 10 mg tablet, Take 10 mg by mouth daily at bedtime, Disp: , Rfl:     lidocaine (Lidoderm) 5 %, Apply 1 patch topically daily Remove & Discard patch within 12 hours or as directed by MD, Disp: , Rfl:     Multiple  "Vitamins-Minerals (multivitamin with minerals) tablet, Take 1 tablet by mouth daily, Disp: , Rfl:     traMADol (Ultram) 50 mg tablet, Take 0.5 tablets (25 mg total) by mouth every 8 (eight) hours as needed for moderate pain, Disp: 30 tablet, Rfl: 0    vitamin B-12 (VITAMIN B-12) 500 mcg tablet, Take 500 mcg by mouth daily, Disp: , Rfl:   Family History   Problem Relation Age of Onset    Pneumonia Mother     Heart attack Father               Coordination of Care: Wound team aware of the treatment plan. Facility nurse will provide wound treatment and monitor the wound for any changes.     Patient / Staff education : Patient / Staff was given education on sign of infection and pressure ulcer prevention. Patient/ Staff verbalized understanding     Follow up :  Next week    Voice-recognition software may have been used in the preparation of this document. Occasional wrong word or \"sound-alike\" substitutions may have occurred due to the inherent limitations of voice recognition software. Interpretation should be guided by context.      SHYAM Moulton  "

## 2024-01-29 NOTE — ASSESSMENT & PLAN NOTE
Right buttock  Right buttock: Wound size is 1 x 1 x 0.1 cm.,  100% partial-thickness, periwound is dry, no drainage, no obvious sign of infection    Assessment and plan  Wound improved compared to last week measurement, 100% partial-thickness  Local wound care with Triad and bordered foam  Continue to offload patient is incontinent  Provide timely continence care  Patient currently on low-air-loss mattress and pressure relief wheelchair cushion  Increase protein intake  Provided update to daughter. All questions answered  Follow up next week

## 2024-01-29 NOTE — ASSESSMENT & PLAN NOTE
Multifactorial  Continues to work with therapy   Reviewed therapy notes  Discussed with therapy about patient

## 2024-02-05 ENCOUNTER — NURSING HOME VISIT (OUTPATIENT)
Dept: GERIATRICS | Facility: OTHER | Age: 84
End: 2024-02-05
Payer: MEDICARE

## 2024-02-05 DIAGNOSIS — G30.1 LATE ONSET ALZHEIMER'S DEMENTIA WITHOUT BEHAVIORAL DISTURBANCE (HCC): ICD-10-CM

## 2024-02-05 DIAGNOSIS — F02.80 LATE ONSET ALZHEIMER'S DEMENTIA WITHOUT BEHAVIORAL DISTURBANCE (HCC): ICD-10-CM

## 2024-02-05 DIAGNOSIS — G89.29 CHRONIC LOW BACK PAIN, UNSPECIFIED BACK PAIN LATERALITY, UNSPECIFIED WHETHER SCIATICA PRESENT: ICD-10-CM

## 2024-02-05 DIAGNOSIS — M54.50 CHRONIC LOW BACK PAIN, UNSPECIFIED BACK PAIN LATERALITY, UNSPECIFIED WHETHER SCIATICA PRESENT: ICD-10-CM

## 2024-02-05 DIAGNOSIS — R26.2 AMBULATORY DYSFUNCTION: Primary | ICD-10-CM

## 2024-02-05 PROCEDURE — 99309 SBSQ NF CARE MODERATE MDM 30: CPT | Performed by: INTERNAL MEDICINE

## 2024-02-05 NOTE — ASSESSMENT & PLAN NOTE
Continue with supportive care  Continue to encourage activity   Continue encourage oral intake  Continue with fall precautions   Continue current meds

## 2024-02-05 NOTE — PROGRESS NOTES
[facility] notes  LONG TERM CARE       NAME: Roque Adhikari  AGE: 83 y.o. SEX: male    DATE OF ENCOUNTER: 2024    Assessment and Plan   Ambulatory dysfunction  Multifactorial  Continues to work with therapy  Reviewed therapy notes  Discussed with therapy about patient  Continue with fall precautions and safety measures     Late onset Alzheimer's dementia without behavioral disturbance (HCC)  Continue with supportive care  Continue to encourage activity   Continue encourage oral intake  Continue with fall precautions   Continue current meds     Low back pain  Doing better   Continue current meds  Continue monitoring symptoms         Chief Complaint     No new complaints but limited due to dementia    History of Present Illness     84 yo male seen for monthly visit and med renewal. Patient seen for ambulatory dysfunction, dementia and low back pain. Reviewed nursing notes since last visit.     PMHx     Past Medical History:   Diagnosis Date    Acute prostatitis     Arthritis     BPH with obstruction/lower urinary tract symptoms     Cervicalgia     Dementia (HCC)     Dysuria     Erectile dysfunction     Feeling of incomplete bladder emptying     Frequency of urination     Nocturia     Urinary retention      Past Surgical History:   Procedure Laterality Date    CATARACT EXTRACTION      HERNIA REPAIR  1998    HIP SURGERY  2019     Family History   Problem Relation Age of Onset    Pneumonia Mother     Heart attack Father      Social History     Socioeconomic History    Marital status: /Civil Union     Spouse name: Not on file    Number of children: Not on file    Years of education: Not on file    Highest education level: Not on file   Occupational History    Not on file   Tobacco Use    Smoking status: Former     Current packs/day: 0.00     Types: Cigarettes     Quit date: 1972     Years since quittin.1    Smokeless tobacco: Never   Vaping Use    Vaping status: Never Used    Substance and Sexual Activity    Alcohol use: Yes    Drug use: No    Sexual activity: Not on file   Other Topics Concern    Not on file   Social History Narrative    Not on file     Social Determinants of Health     Financial Resource Strain: Low Risk  (1/15/2024)    Received from [facility]    Overall Financial Resource Strain (CARDIA)     Difficulty of Paying Living Expenses: Not hard at all   Food Insecurity: No Food Insecurity (1/15/2024)    Received from [facility]    Hunger Vital Sign     Worried About Running Out of Food in the Last Year: Never true     Ran Out of Food in the Last Year: Never true   Transportation Needs: No Transportation Needs (1/15/2024)    Received from [facility]    PRAPARE - Transportation     Lack of Transportation (Medical): No     Lack of Transportation (Non-Medical): No   Physical Activity: Not on file   Stress: Not on file   Social Connections: Not on file   Intimate Partner Violence: Not At Risk (1/15/2024)    Received from [facility]    Humiliation, Afraid, Rape, and Kick questionnaire     Fear of Current or Ex-Partner: No     Emotionally Abused: No     Physically Abused: No     Sexually Abused: No   Housing Stability: Low Risk  (1/15/2024)    Received from [facility]    Housing Stability Vital Sign     Unable to Pay for Housing in the Last Year: No     Number of Places Lived in the Last Year: 1     Unstable Housing in the Last Year: No     No Known Allergies    Review of Systems     Low back pain at times   All other review of system negative but history limited due to dementia       Objective   Vital signs reviewed from facility records.     PHYSICAL EXAM:  GENERAL: no acute distress  SKIN: warm, dry, no rash, no cyanosis  HEENT: normocephalic, atraumatic, no JVD, no Thyromegaly, no lymphadenopathy  LUNGS: CTA, no wheezing, no rales, expanded equally, no chest tenderness   HEART: normal  rhythm, normal rate, no murmur, no gallop  ABDOMEN: soft non tender non distended bs+, no guarding or rebound tenderness  :  no suprapubic tenderness  MUSCULOSKELETAL: strength about 4+/5 all extremities, no calf tenderness  NEUROLOGY: awake, alert, CN2-12 intact.   PSYCH: cooperative, pleasant.       Pertinent Laboratory/Diagnostic Studies:  Recent labs and diagnostic tests reviewed in nursing home EMR    Current Medications   Medications reviewed and signed off on nursing home EMR.        OZZIE PICKETT MD

## 2024-02-05 NOTE — ASSESSMENT & PLAN NOTE
Multifactorial  Continues to work with therapy  Reviewed therapy notes  Discussed with therapy about patient  Continue with fall precautions and safety measures

## 2024-02-08 ENCOUNTER — NURSING HOME VISIT (OUTPATIENT)
Dept: WOUND CARE | Facility: HOSPITAL | Age: 84
End: 2024-02-08
Payer: MEDICARE

## 2024-02-08 DIAGNOSIS — G89.29 CHRONIC LOW BACK PAIN, UNSPECIFIED BACK PAIN LATERALITY, UNSPECIFIED WHETHER SCIATICA PRESENT: ICD-10-CM

## 2024-02-08 DIAGNOSIS — L89.322 PRESSURE INJURY OF LEFT BUTTOCK, STAGE 2 (HCC): ICD-10-CM

## 2024-02-08 DIAGNOSIS — L89.312 PRESSURE INJURY OF RIGHT BUTTOCK, STAGE 2 (HCC): ICD-10-CM

## 2024-02-08 DIAGNOSIS — M54.50 CHRONIC LOW BACK PAIN, UNSPECIFIED BACK PAIN LATERALITY, UNSPECIFIED WHETHER SCIATICA PRESENT: ICD-10-CM

## 2024-02-08 DIAGNOSIS — R26.2 AMBULATORY DYSFUNCTION: Primary | ICD-10-CM

## 2024-02-08 PROCEDURE — 99308 SBSQ NF CARE LOW MDM 20: CPT | Performed by: NURSE PRACTITIONER

## 2024-02-08 RX ORDER — TRAMADOL HYDROCHLORIDE 50 MG/1
25 TABLET ORAL EVERY 8 HOURS PRN
Qty: 30 TABLET | Refills: 0 | Status: SHIPPED | OUTPATIENT
Start: 2024-02-08

## 2024-02-08 NOTE — LETTER
Patient:  Roque Adhikari   1940           SHYAM Moulton saw Roque Adhikari for a wound care visit on 2/8/2024. See below for information relating to this visit.      Chief Complaint   Patient presents with    Follow Up Wound Care Visit        Assessment/Plan:  1. Ambulatory dysfunction  Assessment & Plan:  On STR      2. Pressure injury of right buttock, stage 2 (HCC)  Assessment & Plan:  Right buttock  Wound is healed  Use hydraguard for moisture management and prevention of pressure injury  Continue to offload  Sign off. Facility staff will continue to provide treatment and monitor the wound. Can reconsult wound nurse practitioner if wound failed to heal or worsened.         3. Pressure injury of left buttock, stage 2 (HCC)  Assessment & Plan:  Left buttock  Wound is healed  Use hydraguard for moisture management and prevention of pressure injury  Continue to offload  Sign off. Facility staff will continue to provide treatment and monitor the wound. Can reconsult wound nurse practitioner if wound failed to heal or worsened.              Orders:  Roque Adhikari  1940  Wound:  Buttocks  Discontinue previous wound order  Cleanse the skin/wound bed with soap and water, pat dry  Apply hydraguard to wound bed/skin  Frequency : twice a day and prn for soiling  Offload all wounds  Turn and reposition frequently,   Instruct / Assist with weight shifting in chair  Increase protein intake.  Monitor for any sign of infection or worsening, inform PCP or patient's primary physician in your facility.      Follow Up:  Return if symptoms worsen or fail to improve.       [facility] hours are 8:00 am - 4:30 pm Monday through Friday. The center phone number is [phone]. You can also contact me directly thru my email at [email or web address] or thru tiger text. If it is an emergency, please contact the PCP or patient's attending physician in your facility.      Sincerely,    Electronically signed by SHYAM Moulton    Patient : Roque VALLECILLO Zeynep    1940

## 2024-02-09 NOTE — ASSESSMENT & PLAN NOTE
Left buttock  Wound is healed  Use hydraguard for moisture management and prevention of pressure injury  Continue to offload  Sign off. Facility staff will continue to provide treatment and monitor the wound. Can reconsult wound nurse practitioner if wound failed to heal or worsened.

## 2024-02-09 NOTE — PROGRESS NOTES
[facility] WOUND CARE MANAGEMENT   AND HYPERBARIC MEDICINE CENTER       Patient ID: Roque Adhikari is a 84 y.o. male Date of Birth 1940     Location of Service:[facility]    Performed wound round with: Wound team     Chief Complaint : Buttocks    Wound Instructions:  Wound:  Buttocks  Discontinue previous wound order  Cleanse the skin/wound bed with soap and water, pat dry  Apply hydraguard to wound bed/skin  Frequency : twice a day and prn for soiling  Offload all wounds  Turn and reposition frequently,   Instruct / Assist with weight shifting in chair  Increase protein intake.  Monitor for any sign of infection or worsening, inform PCP or patient's primary physician in your facility.        Allergies  Patient has no known allergies.      Assessment & Plan:  1. Ambulatory dysfunction  Assessment & Plan:  On STR      2. Pressure injury of right buttock, stage 2 (HCC)  Assessment & Plan:  Right buttock  Wound is healed  Use hydraguard for moisture management and prevention of pressure injury  Continue to offload  Sign off. Facility staff will continue to provide treatment and monitor the wound. Can reconsult wound nurse practitioner if wound failed to heal or worsened.         3. Pressure injury of left buttock, stage 2 (HCC)  Assessment & Plan:  Left buttock  Wound is healed  Use hydraguard for moisture management and prevention of pressure injury  Continue to offload  Sign off. Facility staff will continue to provide treatment and monitor the wound. Can reconsult wound nurse practitioner if wound failed to heal or worsened.                  Subjective:   1/29/2024This is a 83 y.o., male referred to our service for wound/ skin alterations on buttocks.Patient have a complex medical history including but not limited to BPH, RA, and dementia . Patient was referred by [facility]. Patient was seen in collaboration with the facility wound team.     Wound History: As per report, patient was admitted in the  facility with pressure injury on the buttocks.    Received patient in chair, seems comfortable.  Denies pain.  Patient was incontinent of bowel movement.  Patient is wheelchair-bound.  Able to stand with a walker, with minimal assistance.  Current wound treatment is Triad and bordered foam.    2/8/2024 Follow up for wound on the buttocks . Received patient, not in distress. Facility staff did not report any significant issues related to the wound. Denies pain.               Review of Systems   Constitutional: Negative.    Respiratory: Negative.     Cardiovascular: Negative.    Skin:  Positive for wound.       Objective:    Physical Exam  Constitutional:       Appearance: Normal appearance.   Cardiovascular:      Rate and Rhythm: Normal rate.   Pulmonary:      Effort: Pulmonary effort is normal.   Genitourinary:     Comments: incontinent  Musculoskeletal:      Comments: Wheelchair bound  Able to stand with min assistance, with rolling walker   Skin:     Findings: Lesion present.      Comments: Left buttock: Wound is healed, skin is dry    Right buttock: Wound size is 1 x 1 x 0.1 cm.,  100% partial-thickness, periwound is dry, no drainage, no obvious sign of infection   Neurological:      Mental Status: He is alert.              Procedures           Patient's care was coordinated with nursing facility staff. Recent vitals, labs and updated medications were reviewed on EMR or chart system of facility. Past Medical, surgical, social, medication and allergy history and patient's previous records were reviewed and updated as appropriate: Most up-to date information is available in the facility EMR where the patient is currently admitted.    Patient Active Problem List   Diagnosis    Urinary frequency    BPH with obstruction/lower urinary tract symptoms    Cognitive decline    Urge incontinence of urine    Morbid obesity due to excess calories (HCC)    DDD (degenerative disc disease), cervical    Mixed hyperlipidemia     PAD (peripheral artery disease) (HCC)    Spinal stenosis of lumbar region with neurogenic claudication    Low back pain    Osteoarthritis of left hip    Pain of both hip joints    Spondylosis of lumbosacral joint without myelopathy    Late onset Alzheimer's dementia without behavioral disturbance (HCC)    Primary osteoarthritis of both hips    Ambulatory dysfunction    Vitamin D deficiency    COVID-19 virus detected    Bilateral sacroiliitis (HCC)    Generalized osteoarthritis    Obstructive sleep apnea    Lethargy    Fever    Skin tear of forearm without complication, initial encounter    Bilateral pneumonia    Encephalopathy, toxic    Sepsis without acute organ dysfunction (HCC)    Osteoarthrosis    Nocturia    RBBB    Acute metabolic encephalopathy    Pressure injury of skin of obie cleft    Pressure injury of right buttock, stage 2 (HCC)    Pressure injury of left buttock, stage 2 (HCC)     Past Medical History:   Diagnosis Date    Acute prostatitis     Arthritis     BPH with obstruction/lower urinary tract symptoms     Cervicalgia     Dementia (HCC)     Dysuria     Erectile dysfunction     Feeling of incomplete bladder emptying     Frequency of urination     Nocturia     Urinary retention      Past Surgical History:   Procedure Laterality Date    CATARACT EXTRACTION      HERNIA REPAIR  1998    HIP SURGERY  2019     Social History     Socioeconomic History    Marital status: /Civil Union     Spouse name: None    Number of children: None    Years of education: None    Highest education level: None   Occupational History    None   Tobacco Use    Smoking status: Former     Current packs/day: 0.00     Types: Cigarettes     Quit date: 1972     Years since quittin.1    Smokeless tobacco: Never   Vaping Use    Vaping status: Never Used   Substance and Sexual Activity    Alcohol use: Yes    Drug use: No    Sexual activity: None   Other Topics Concern    None   Social History Narrative    None      Social Determinants of Health     Financial Resource Strain: Low Risk  (1/15/2024)    Received from [facility]    Overall Financial Resource Strain (CARDIA)     Difficulty of Paying Living Expenses: Not hard at all   Food Insecurity: No Food Insecurity (1/15/2024)    Received from [facility]    Hunger Vital Sign     Worried About Running Out of Food in the Last Year: Never true     Ran Out of Food in the Last Year: Never true   Transportation Needs: No Transportation Needs (1/15/2024)    Received from [facility]    PRAPARE - Transportation     Lack of Transportation (Medical): No     Lack of Transportation (Non-Medical): No   Physical Activity: Not on file   Stress: Not on file   Social Connections: Not on file   Intimate Partner Violence: Not At Risk (1/15/2024)    Received from [facility]    Humiliation, Afraid, Rape, and Kick questionnaire     Fear of Current or Ex-Partner: No     Emotionally Abused: No     Physically Abused: No     Sexually Abused: No   Housing Stability: Low Risk  (1/15/2024)    Received from [facility]    Housing Stability Vital Sign     Unable to Pay for Housing in the Last Year: No     Number of Places Lived in the Last Year: 1     Unstable Housing in the Last Year: No        Current Outpatient Medications:     acetaminophen (TYLENOL) 325 mg tablet, Take 650 mg by mouth every 6 (six) hours as needed, Disp: , Rfl:     acetaminophen (TYLENOL) 500 mg tablet, Take 1,000 mg by mouth 2 (two) times a day, Disp: , Rfl:     alfuzosin (UROXATRAL) 10 mg 24 hr tablet, TAKE 1 TABLET BY MOUTH EVERY DAY, Disp: 90 tablet, Rfl: 1    Cholecalciferol (Vitamin D3) 50 MCG (2000 UT) CHEW, Chew 2,000 Units in the morning, Disp: , Rfl:     donepezil (ARICEPT) 10 mg tablet, Take 10 mg by mouth daily at bedtime, Disp: , Rfl:     lidocaine (Lidoderm) 5 %, Apply 1 patch topically daily Remove & Discard patch within 12 hours or  "as directed by MD, Disp: , Rfl:     Multiple Vitamins-Minerals (multivitamin with minerals) tablet, Take 1 tablet by mouth daily, Disp: , Rfl:     traMADol (Ultram) 50 mg tablet, Take 0.5 tablets (25 mg total) by mouth every 8 (eight) hours as needed for moderate pain, Disp: 30 tablet, Rfl: 0    vitamin B-12 (VITAMIN B-12) 500 mcg tablet, Take 500 mcg by mouth daily, Disp: , Rfl:   Family History   Problem Relation Age of Onset    Pneumonia Mother     Heart attack Father               Coordination of Care: Wound team aware of the treatment plan. Facility nurse will provide wound treatment and monitor the wound for any changes.     Patient / Staff education : Patient / Staff was given education on sign of infection and pressure ulcer prevention. Patient/ Staff verbalized understanding     Follow up :  Next week    Voice-recognition software may have been used in the preparation of this document. Occasional wrong word or \"sound-alike\" substitutions may have occurred due to the inherent limitations of voice recognition software. Interpretation should be guided by context.      SHYAM Moulton  "

## 2024-02-09 NOTE — ASSESSMENT & PLAN NOTE
Right buttock  Wound is healed  Use hydraguard for moisture management and prevention of pressure injury  Continue to offload  Sign off. Facility staff will continue to provide treatment and monitor the wound. Can reconsult wound nurse practitioner if wound failed to heal or worsened.

## 2024-02-21 PROBLEM — J18.9 BILATERAL PNEUMONIA: Status: RESOLVED | Noted: 2024-01-16 | Resolved: 2024-02-21

## 2024-02-21 PROBLEM — R50.9 FEVER: Status: RESOLVED | Noted: 2024-01-11 | Resolved: 2024-02-21

## 2024-03-18 ENCOUNTER — NURSING HOME VISIT (OUTPATIENT)
Dept: GERIATRICS | Facility: OTHER | Age: 84
End: 2024-03-18
Payer: MEDICARE

## 2024-03-18 DIAGNOSIS — G30.1 LATE ONSET ALZHEIMER'S DEMENTIA WITHOUT BEHAVIORAL DISTURBANCE (HCC): ICD-10-CM

## 2024-03-18 DIAGNOSIS — M54.50 CHRONIC LOW BACK PAIN, UNSPECIFIED BACK PAIN LATERALITY, UNSPECIFIED WHETHER SCIATICA PRESENT: ICD-10-CM

## 2024-03-18 DIAGNOSIS — N40.1 BPH WITH OBSTRUCTION/LOWER URINARY TRACT SYMPTOMS: Primary | ICD-10-CM

## 2024-03-18 DIAGNOSIS — G89.29 CHRONIC LOW BACK PAIN, UNSPECIFIED BACK PAIN LATERALITY, UNSPECIFIED WHETHER SCIATICA PRESENT: ICD-10-CM

## 2024-03-18 DIAGNOSIS — F02.80 LATE ONSET ALZHEIMER'S DEMENTIA WITHOUT BEHAVIORAL DISTURBANCE (HCC): ICD-10-CM

## 2024-03-18 DIAGNOSIS — N13.8 BPH WITH OBSTRUCTION/LOWER URINARY TRACT SYMPTOMS: Primary | ICD-10-CM

## 2024-03-18 PROCEDURE — 99309 SBSQ NF CARE MODERATE MDM 30: CPT | Performed by: INTERNAL MEDICINE

## 2024-03-18 NOTE — ASSESSMENT & PLAN NOTE
Seems stable  Continue tylenol  Continue monitoring symptoms   Continue with activity as tolerable

## 2024-03-18 NOTE — ASSESSMENT & PLAN NOTE
Seems to be stable  Continue current meds  Continues to require help with ADLs  Continue to encourage oral intake  Continue with safety measures

## 2024-03-18 NOTE — PROGRESS NOTES
[facility] notes  LONG TERM CARE       NAME: Roque Adhikari  AGE: 84 y.o. SEX: male    DATE OF ENCOUNTER: 3/18/2024    Assessment and Plan   BPH with obstruction/lower urinary tract symptoms  Continue current meds  Continue monitoring symptoms     Low back pain  Seems stable  Continue tylenol  Continue monitoring symptoms   Continue with activity as tolerable     Late onset Alzheimer's dementia without behavioral disturbance (HCC)  Seems to be stable  Continue current meds  Continues to require help with ADLs  Continue to encourage oral intake  Continue with safety measures         Chief Complaint     No new complaints    History of Present Illness     83 yo male seen for monthly visit and med renewal. Patient seen for dementia, low back pain and BPH. Reviewed nursing notes since last visit.     PMHx     Past Medical History:   Diagnosis Date    Acute prostatitis     Arthritis     BPH with obstruction/lower urinary tract symptoms     Cervicalgia     Dementia (HCC)     Dysuria     Erectile dysfunction     Feeling of incomplete bladder emptying     Frequency of urination     Nocturia     Urinary retention      Past Surgical History:   Procedure Laterality Date    CATARACT EXTRACTION      HERNIA REPAIR  1998    HIP SURGERY  2019     Family History   Problem Relation Age of Onset    Pneumonia Mother     Heart attack Father      Social History     Socioeconomic History    Marital status: /Civil Union     Spouse name: Not on file    Number of children: Not on file    Years of education: Not on file    Highest education level: Not on file   Occupational History    Not on file   Tobacco Use    Smoking status: Former     Current packs/day: 0.00     Types: Cigarettes     Quit date: 1972     Years since quittin.2    Smokeless tobacco: Never   Vaping Use    Vaping status: Never Used   Substance and Sexual Activity    Alcohol use: Yes    Drug use: No    Sexual activity: Not on file   Other  Topics Concern    Not on file   Social History Narrative    Not on file     Social Determinants of Health     Financial Resource Strain: Low Risk  (1/15/2024)    Received from [facility]    Overall Financial Resource Strain (CARDIA)     Difficulty of Paying Living Expenses: Not hard at all   Food Insecurity: No Food Insecurity (1/15/2024)    Received from [facility]    Hunger Vital Sign     Worried About Running Out of Food in the Last Year: Never true     Ran Out of Food in the Last Year: Never true   Transportation Needs: No Transportation Needs (1/15/2024)    Received from [facility]    PRAPARE - Transportation     Lack of Transportation (Medical): No     Lack of Transportation (Non-Medical): No   Physical Activity: Not on file   Stress: Not on file   Social Connections: Not on file   Intimate Partner Violence: Not At Risk (1/15/2024)    Received from [facility]    Humiliation, Afraid, Rape, and Kick questionnaire     Fear of Current or Ex-Partner: No     Emotionally Abused: No     Physically Abused: No     Sexually Abused: No   Housing Stability: Low Risk  (1/15/2024)    Received from [facility]    Housing Stability Vital Sign     Unable to Pay for Housing in the Last Year: No     Number of Places Lived in the Last Year: 1     Unstable Housing in the Last Year: No     No Known Allergies    Review of Systems     Denies any pain or shortness of breath at present time  All other review of system negative but history limited due to dementia       Objective   Vital signs reviewed from facility records.     PHYSICAL EXAM:  GENERAL: no acute distress  SKIN: warm, dry, no rash, no cyanosis  HEENT: normocephalic, atraumatic, no JVD, no Thyromegaly, no lymphadenopathy  LUNGS: CTA, no wheezing, no rales, expanded equally, no chest tenderness   HEART: normal rhythm, normal rate, no murmur, no gallop  ABDOMEN: soft non tender non  distended bs+, no guarding or rebound tenderness  :  no suprapubic tenderness  MUSCULOSKELETAL: strength about 4+/5 all extremities, no calf tenderness  NEUROLOGY: awake, alert,  CN2-12 intact.   PSYCH: cooperative, pleasant.       Pertinent Laboratory/Diagnostic Studies:  Recent labs and diagnostic tests reviewed in nursing home EMR    Current Medications   Medications reviewed and signed off on nursing home EMR.        OZZIE PICKETT MD

## 2024-04-15 ENCOUNTER — NURSING HOME VISIT (OUTPATIENT)
Dept: GERIATRICS | Facility: OTHER | Age: 84
End: 2024-04-15
Payer: MEDICARE

## 2024-04-15 DIAGNOSIS — E78.2 MIXED HYPERLIPIDEMIA: ICD-10-CM

## 2024-04-15 DIAGNOSIS — M15.9 GENERALIZED OSTEOARTHRITIS: ICD-10-CM

## 2024-04-15 DIAGNOSIS — R26.2 AMBULATORY DYSFUNCTION: ICD-10-CM

## 2024-04-15 DIAGNOSIS — I73.9 PAD (PERIPHERAL ARTERY DISEASE) (HCC): Primary | ICD-10-CM

## 2024-04-15 PROCEDURE — 99309 SBSQ NF CARE MODERATE MDM 30: CPT | Performed by: INTERNAL MEDICINE

## 2024-04-15 RX ORDER — LANOLIN ALCOHOL/MO/W.PET/CERES
3 CREAM (GRAM) TOPICAL
COMMUNITY

## 2024-04-15 RX ORDER — POLYETHYLENE GLYCOL 3350 17 G/17G
17 POWDER, FOR SOLUTION ORAL DAILY
COMMUNITY

## 2024-04-15 RX ORDER — LIDOCAINE 4 G/G
1 PATCH TOPICAL DAILY
COMMUNITY

## 2024-04-15 NOTE — PROGRESS NOTES
[facility] notes  LONG TERM CARE       NAME: Roque Adhikari  AGE: 84 y.o. SEX: male    DATE OF ENCOUNTER: 4/15/2024    Assessment and Plan   PAD (peripheral artery disease) (HCC)  No complaints  Not on meds   Continue monitoring symptoms     Ambulatory dysfunction  Multifactorial  Continue with safety measures  Continue with fall precautions      Generalized osteoarthritis  Continue tylenol  Continue with activity as tolerable   Continue monitoring symptoms     Mixed hyperlipidemia  Not on statins  Last lipid last August - good range   Continue monitoring lipid panel         Chief Complaint     No new complaints     History of Present Illness     85 yo male seen for monthly visit and med renewal. Patient seen for PAD, ambulatory dysfunction, arthritis and HLD. Reviewed nursing notes since last visit.     PMHx     Past Medical History:   Diagnosis Date    Acute prostatitis     Arthritis     BPH with obstruction/lower urinary tract symptoms     Cervicalgia     Dementia (HCC)     Dysuria     Erectile dysfunction     Feeling of incomplete bladder emptying     Frequency of urination     Nocturia     Urinary retention      Past Surgical History:   Procedure Laterality Date    CATARACT EXTRACTION      HERNIA REPAIR  1998    HIP SURGERY  2019     Family History   Problem Relation Age of Onset    Pneumonia Mother     Heart attack Father      Social History     Socioeconomic History    Marital status: /Civil Union     Spouse name: Not on file    Number of children: Not on file    Years of education: Not on file    Highest education level: Not on file   Occupational History    Not on file   Tobacco Use    Smoking status: Former     Current packs/day: 0.00     Types: Cigarettes     Quit date: 1972     Years since quittin.3    Smokeless tobacco: Never   Vaping Use    Vaping status: Never Used   Substance and Sexual Activity    Alcohol use: Yes    Drug use: No    Sexual activity: Not on  file   Other Topics Concern    Not on file   Social History Narrative    Not on file     Social Determinants of Health     Financial Resource Strain: Low Risk  (1/15/2024)    Received from [facility]    Overall Financial Resource Strain (CARDIA)     Difficulty of Paying Living Expenses: Not hard at all   Food Insecurity: No Food Insecurity (1/15/2024)    Received from [facility]    Hunger Vital Sign     Worried About Running Out of Food in the Last Year: Never true     Ran Out of Food in the Last Year: Never true   Transportation Needs: No Transportation Needs (1/15/2024)    Received from [facility]    PRAPARE - Transportation     Lack of Transportation (Medical): No     Lack of Transportation (Non-Medical): No   Physical Activity: Not on file   Stress: Not on file   Social Connections: Not on file   Intimate Partner Violence: Not At Risk (1/15/2024)    Received from [facility]    Humiliation, Afraid, Rape, and Kick questionnaire     Fear of Current or Ex-Partner: No     Emotionally Abused: No     Physically Abused: No     Sexually Abused: No   Housing Stability: Low Risk  (1/15/2024)    Received from [facility]    Housing Stability Vital Sign     Unable to Pay for Housing in the Last Year: No     Number of Places Lived in the Last Year: 1     Unstable Housing in the Last Year: No     No Known Allergies    Review of Systems     Denies any pain or shortness of breath  All other review of system negative but hx limited due to dementia       Objective   Vital signs reviewed from facility records.     PHYSICAL EXAM:  GENERAL: no acute distress  SKIN: warm, dry, no rash, no cyanosis  HEENT: normocephalic, atraumatic, no JVD, no Thyromegaly, no lymphadenopathy  LUNGS: CTA, no wheezing, no rales, expanded equally, no chest tenderness   HEART: normal rhythm, normal rate, no murmur, no gallop  ABDOMEN: soft non tender non distended bs+,  no guarding or rebound tenderness  :  no suprapubic tenderness  MUSCULOSKELETAL: strength about 4+/5 all extremities, no calf tenderness  NEUROLOGY: awake, alert, CN2-12 intact.   PSYCH: cooperative, pleasant.       Pertinent Laboratory/Diagnostic Studies:  Recent labs and diagnostic tests reviewed in nursing home EMR    Current Medications   Medications reviewed and signed off on nursing home EMR.        OZZIE PICKETT MD

## 2024-05-17 ENCOUNTER — NURSING HOME VISIT (OUTPATIENT)
Dept: GERIATRICS | Facility: OTHER | Age: 84
End: 2024-05-17
Payer: MEDICARE

## 2024-05-17 DIAGNOSIS — N13.8 BPH WITH OBSTRUCTION/LOWER URINARY TRACT SYMPTOMS: Primary | ICD-10-CM

## 2024-05-17 DIAGNOSIS — G30.1 LATE ONSET ALZHEIMER'S DEMENTIA WITHOUT BEHAVIORAL DISTURBANCE (HCC): ICD-10-CM

## 2024-05-17 DIAGNOSIS — K59.04 CHRONIC IDIOPATHIC CONSTIPATION: ICD-10-CM

## 2024-05-17 DIAGNOSIS — F02.80 LATE ONSET ALZHEIMER'S DEMENTIA WITHOUT BEHAVIORAL DISTURBANCE (HCC): ICD-10-CM

## 2024-05-17 DIAGNOSIS — N40.1 BPH WITH OBSTRUCTION/LOWER URINARY TRACT SYMPTOMS: Primary | ICD-10-CM

## 2024-05-17 DIAGNOSIS — M50.30 DDD (DEGENERATIVE DISC DISEASE), CERVICAL: ICD-10-CM

## 2024-05-17 PROCEDURE — 99309 SBSQ NF CARE MODERATE MDM 30: CPT | Performed by: INTERNAL MEDICINE

## 2024-05-17 NOTE — ASSESSMENT & PLAN NOTE
Seems stable  Continue donepezil   Continue monitoring symptoms  Continue with supportive care   Continue with safety measures

## 2024-05-17 NOTE — PROGRESS NOTES
[facility] notes  LONG TERM CARE       NAME: Roque Adhikari  AGE: 84 y.o. SEX: male    DATE OF ENCOUNTER: 5/17/2024    Assessment and Plan   BPH with obstruction/lower urinary tract symptoms  Seem stable  Continue alfuzosin  Continue monitoring symptoms     DDD (degenerative disc disease), cervical  Continue tylenol   Seems to be stable  Continue monitoring symptoms  Continue supportive care     Late onset Alzheimer's dementia without behavioral disturbance (HCC)  Seems stable  Continue donepezil   Continue monitoring symptoms  Continue with supportive care   Continue with safety measures     Chronic idiopathic constipation  No report of any issues  Continue miralax  Continue with bowel protocol         Chief Complaint     No new complaints but history limited due to dementia     History of Present Illness     85 yo male seen for monthly visit and med renewal. Patient seen for dementia, degenerative disc disease, BPH and constipation. Reviewed nursing notes since last visit.     PMHx     Past Medical History:   Diagnosis Date    Acute prostatitis     Arthritis     BPH with obstruction/lower urinary tract symptoms     Cervicalgia     Dementia (HCC)     Dysuria     Erectile dysfunction     Feeling of incomplete bladder emptying     Frequency of urination     Nocturia     Urinary retention      Past Surgical History:   Procedure Laterality Date    CATARACT EXTRACTION      HERNIA REPAIR  1998    HIP SURGERY  06/2019     Family History   Problem Relation Age of Onset    Pneumonia Mother     Heart attack Father      Social History     Socioeconomic History    Marital status: /Civil Union     Spouse name: Not on file    Number of children: Not on file    Years of education: Not on file    Highest education level: Not on file   Occupational History    Not on file   Tobacco Use    Smoking status: Former     Current packs/day: 0.00     Types: Cigarettes     Quit date: 1/1/1972     Years since  quittin.4    Smokeless tobacco: Never   Vaping Use    Vaping status: Never Used   Substance and Sexual Activity    Alcohol use: Yes    Drug use: No    Sexual activity: Not on file   Other Topics Concern    Not on file   Social History Narrative    Not on file     Social Determinants of Health     Financial Resource Strain: Low Risk  (1/15/2024)    Received from [facility]    Overall Financial Resource Strain (CARDIA)     Difficulty of Paying Living Expenses: Not hard at all   Food Insecurity: No Food Insecurity (1/15/2024)    Received from [facility]    Hunger Vital Sign     Worried About Running Out of Food in the Last Year: Never true     Ran Out of Food in the Last Year: Never true   Transportation Needs: No Transportation Needs (1/15/2024)    Received from [facility]    PRAPARE - Transportation     Lack of Transportation (Medical): No     Lack of Transportation (Non-Medical): No   Physical Activity: Not on file   Stress: Not on file   Social Connections: Not on file   Intimate Partner Violence: Not At Risk (1/15/2024)    Received from [facility]    Humiliation, Afraid, Rape, and Kick questionnaire     Fear of Current or Ex-Partner: No     Emotionally Abused: No     Physically Abused: No     Sexually Abused: No   Housing Stability: Low Risk  (1/15/2024)    Received from [facility]    Housing Stability Vital Sign     Unable to Pay for Housing in the Last Year: No     Number of Places Lived in the Last Year: 1     In the last 12 months, was there a time when you did not have a steady place to sleep or slept in a shelter (including now)?: No     No Known Allergies    Review of Systems     Denies any pain or shortness of breath  All other review of system negative but hx limited due to dementia       Objective   Vital signs reviewed from facility records.     PHYSICAL EXAM:  GENERAL: no acute distress  SKIN: warm, dry, no  rash, no cyanosis  HEENT: normocephalic, atraumatic, no JVD, no Thyromegaly, no lymphadenopathy  LUNGS: CTA, no wheezing, no rales, expanded equally, no chest tenderness   HEART: normal rhythm, normal rate, no murmur, no gallop  ABDOMEN: soft non tender non distended bs+, no guarding or rebound tenderness  :  no suprapubic tenderness  MUSCULOSKELETAL: strength about 4+/5 all extremities,  no calf tenderness  NEUROLOGY: awake, alert, CN2-12 intact.   PSYCH: cooperative, pleasant.       Pertinent Laboratory/Diagnostic Studies:  Recent labs and diagnostic tests reviewed in nursing home EMR    Current Medications   Medications reviewed and signed off on nursing home EMR.  Prescriptions drug management - Patient prescriptions sent to pharmacy via renewal in [employer] system       OZZIE PICKETT MD

## 2024-06-21 ENCOUNTER — NURSING HOME VISIT (OUTPATIENT)
Dept: GERIATRICS | Facility: OTHER | Age: 84
End: 2024-06-21
Payer: MEDICARE

## 2024-06-21 DIAGNOSIS — M15.9 GENERALIZED OSTEOARTHRITIS: ICD-10-CM

## 2024-06-21 DIAGNOSIS — N13.8 BPH WITH OBSTRUCTION/LOWER URINARY TRACT SYMPTOMS: ICD-10-CM

## 2024-06-21 DIAGNOSIS — N40.1 BPH WITH OBSTRUCTION/LOWER URINARY TRACT SYMPTOMS: ICD-10-CM

## 2024-06-21 DIAGNOSIS — K59.04 CHRONIC IDIOPATHIC CONSTIPATION: ICD-10-CM

## 2024-06-21 DIAGNOSIS — G30.1 LATE ONSET ALZHEIMER'S DEMENTIA WITHOUT BEHAVIORAL DISTURBANCE (HCC): Primary | ICD-10-CM

## 2024-06-21 DIAGNOSIS — F02.80 LATE ONSET ALZHEIMER'S DEMENTIA WITHOUT BEHAVIORAL DISTURBANCE (HCC): Primary | ICD-10-CM

## 2024-06-21 DIAGNOSIS — R26.2 AMBULATORY DYSFUNCTION: ICD-10-CM

## 2024-06-21 PROCEDURE — 99309 SBSQ NF CARE MODERATE MDM 30: CPT | Performed by: NURSE PRACTITIONER

## 2024-06-21 NOTE — ASSESSMENT & PLAN NOTE
Currently in a manual wheelchair -patient can slowly self propel but is mostly assisted by nursing staff  Hx of fall  Continue safety daily and fall precautions

## 2024-06-21 NOTE — PROGRESS NOTES
"[facility]  [address] [place] [address], [address], [place] [address]  [phone]    NAME: Roque Adhikari  AGE: 84 y.o. SEX: male    Progress Note    Location: [facility]  POS: 32    Assessment/Plan:    Late onset Alzheimer's dementia without behavioral disturbance (HCC)  Patient is alert/ engaged verbally - oriented x 2 (name/birthday)  Weight gain on review  Continue Melatonin 3mg at HS/ Donepezil 10mg at HS  Continue supplement: MVI daily/ Vit D 2,000 units daily/ Vit B12 500mcg daily  Continue reorientation/redirection as often as needed  Continue LTCF supportive care    Generalized osteoarthritis  Patient reporting feeling achy on his back today  Continue Tylenol 1G BID and Lidocaine 4% to low back daily  Nursing to continue to assess for pain every shift while awake    Ambulatory dysfunction  Currently in a manual wheelchair -patient can slowly self propel but is mostly assisted by nursing staff  Hx of fall  Continue safety daily and fall precautions    BPH with obstruction/lower urinary tract symptoms  Patient denies any  related concerns in this visit  Continue Alfuzosin ER 10mg daily     Chronic idiopathic constipation  Continue Miralax 17G daily      Chief complaint / Reason for visit:  Follow- visit    History of Present Illness:  This is an 84-year-old male patient residing at [facility].  Patient is seen and examined today for follow-up any acute and chronic medical conditions.  Patient is out of bed for this visit sitting in manual wheelchair - well dressed -alert, cooperative, calm, very pleasant and not in distress.  Patient is verbally engaged with clear coherent speech -oriented to name and birthdate only.  Patient acknowledged feeling well on this visit, \" I'm all good I think\" -patient reported I'm a little achy in the back\" -patient repositioned in chair and reported relief.  Otherwise patient denies any other acute medical concerns during ROS assessment.  Nursing has " no acute medical concerns for this visit.    Nursing and prior provider notes reviewed on this visit. Discussed visit with PCP and nursing staff/ supervisor.    Review of Systems:  Per history of present illness, all other systems reviewed and negative.    HISTORY:  Medical Hx: Reviewed, unchanged  Family Hx: Reviewed, unchanged  Soc Hx: Reviewed,  unchanged    ALLERGY: Reviewed, unchanged. No Known Allergies     PHYSICAL EXAM:  Vital Signs: T 97.8F -HR 72 -R 18 -BP: 120/70 (6/17/2024) -SpO2 96% RA  Weight: 207.0 lbs (6/3/2024) <= 204.7 lbs (5/1/2024) <= 203.2 lbs (4/1/2024)    General: NAD. Well appearing. No acute distress  Head: Atraumatic. Normocephalic.  Eye Exam: anicteric sclera, no discharge, PERRLA, No injection. Wears glasses.  Oral Exam: moist mucous membranes, no buccaloropharyngeal erythema, palatine tonsils WNL.  Neck Exam: no anterior cervical lymphadenopathy noted, neck supple. Trachea midline, no carotid bruit, no masses  Cardiovascular: regular rate, regular rhythm, no: murmurs/ rubs/ gallops. S1 and S2 appreciated.  Pulmonary: no wheeze, no rhonchi, no rales. No chest tenderness. Normal chest wall expansion  Abdominal: soft, non-tender, nondistended, bowel sounds audible x 4 quadrants. No palpable hepatosplenomegaly, no tympany  : Non distended bladder. Continent.  Extremities and skin: no edema noted, no rashes. Intact skin  Neurological: alert, cooperative and responsive, Oriented x 2. No tics, normal sensation to pressure and light touch.  moving all 4 extremities symmetrically. Uses manual wheelchair - can self propel slowly otherwise assisted.    Laboratory / Imaging results reviewed.  Last labs done on Feb/2024.    Current Medications: All medications reviewed and updated in Nursing Home eMAR.    Please note: This note was completed in part utilizing a voice-recognition software may have been used in the preparation of this document. Grammatical errors, random word insertion, spelling  "mistakes, and incomplete sentences may be an occasional consequence of the system secondary to software limitations, ambient noise and hardware issues. Occasional wrong word or \"sound-alike\" substitutions may have occurred due to the inherent limitations of voice recognition software. At the time of dictation, efforts were made to edit, clarify and/or correct errors. Interpretation should be guided by context. Please read the chart carefully and recognize, using context, where substitutions have occurred. If you have any questions or concerns about the context, text or information contained within the body of this dictation, please contact myself, the provider, for further clarification.      SHYAM Garcia  6/21/2024  "

## 2024-06-21 NOTE — ASSESSMENT & PLAN NOTE
Patient reporting feeling achy on his back today  Continue Tylenol 1G BID and Lidocaine 4% to low back daily  Nursing to continue to assess for pain every shift while awake

## 2024-06-21 NOTE — ASSESSMENT & PLAN NOTE
Patient is alert/ engaged verbally - oriented x 2 (name/birthday)  Weight gain on review  Continue Melatonin 3mg at HS/ Donepezil 10mg at HS  Continue supplement: MVI daily/ Vit D 2,000 units daily/ Vit B12 500mcg daily  Continue reorientation/redirection as often as needed  Continue LTCF supportive care

## 2024-07-02 ENCOUNTER — NURSING HOME VISIT (OUTPATIENT)
Dept: GERIATRICS | Facility: OTHER | Age: 84
End: 2024-07-02
Payer: MEDICARE

## 2024-07-02 DIAGNOSIS — R53.83 LETHARGY: Primary | ICD-10-CM

## 2024-07-02 DIAGNOSIS — J06.9 UPPER RESPIRATORY TRACT INFECTION, UNSPECIFIED TYPE: ICD-10-CM

## 2024-07-02 PROCEDURE — 99309 SBSQ NF CARE MODERATE MDM 30: CPT | Performed by: NURSE PRACTITIONER

## 2024-07-02 NOTE — ASSESSMENT & PLAN NOTE
Patient presents with respiratory symptoms with associated lethargy  (+) clear rhinorrhea, lethargy, poor appetite, poor inspiratory effort  No hypoxia on RA  (+) cold hands with cyanosis to fingertips  Start CXR (2 views) to rule out pneumonia  Start Duo-neb TID x 5 days  Start Fexofenadine 60mg daily x 7 days  Start Guaifenesin 400mg TID x 5 days  Labs STAT: CBC with diff, CMP and Respiratory panel  V/S daily x 5 days

## 2024-07-02 NOTE — PROGRESS NOTES
[address], [address], [place] [address]  [phone]    NAME: Roque Adhikari  AGE: 84 y.o. SEX: male    Progress Note    Location: [facility]  POS: 32    Assessment/Plan:    Lethargy  Noted this morning by nursing  When patient is at baseline, patient is alert, verbal and engaged  Noted respiratory symptoms: rhinorrhea, nasal congestion. No respiratory distress  Start STAT work-up: CXR (2 views), CMP, CBC with diff and Respiratory panel  Check UA with C&S  V/S daily x 5 days    URI (upper respiratory infection)  Patient presents with respiratory symptoms with associated lethargy  (+) clear rhinorrhea, lethargy, poor appetite, poor inspiratory effort  No hypoxia on RA  (+) cold hands with cyanosis to fingertips  Start CXR (2 views) to rule out pneumonia  Start Duo-neb TID x 5 days  Start Fexofenadine 60mg daily x 7 days  Start Guaifenesin 400mg TID x 5 days  Labs STAT: CBC with diff, CMP and Respiratory panel  V/S daily x 5 days      Chief complaint / Reason for visit: Acute Visit    History of Present Illness:  This is an 84-year-old male patient residing at [facility].  Patient is seen and examined today per nursing request for lethargy, nasal and chest congestion, poor appetite at breakfast noted this morning. Patient OOB for this visit sitting on manual wheelchair - lethargic versus severely fatigued. Patient did not respond verbally but cooperative and not in distress. On assessment, tight airways, poor deep inspiratory effort, clear rhinorrhea. VSS: T98.1 -P86 -R16 SpO2; 98% RA. Will order infectious work-up: STAT CXR, CBC with diff, CMP, Respiratory panel.     Nursing and prior provider notes reviewed on this visit. Discussed visit with PCP and nursing staff/ supervisor.    Review of Systems:  Per history of present illness, all other systems reviewed and negative.    HISTORY:  Medical Hx: Reviewed, unchanged  Family Hx: Reviewed, unchanged  Soc Hx:  "Reviewed,  unchanged    ALLERGY: Reviewed, unchanged. No Known Allergies     PHYSICAL EXAM:  Vital Signs: T 98.1F -HR 86 -R 17 -BP: 143/79 (7/1/2024) -SpO2 98% RA  Weight: 203.0 lbs (6/3/2024)    General: Lethargic. Ill-appearing. No acute distress  Head: Atraumatic. Normocephalic.  Eye Exam: anicteric sclera, no discharge, PERRLA, No injection. Wears glasses.  Oral Exam: moist mucous membranes, no buccaloropharyngeal erythema, palatine tonsils WNL.  Nose: Clear rhinorrhea.  Neck Exam: no anterior cervical lymphadenopathy noted, neck supple. Trachea midline, no carotid bruit, no masses  Cardiovascular: regular rate, regular rhythm, no: murmurs/ rubs/ gallops. S1 and S2 appreciated.  Pulmonary: no wheeze, no rhonchi, no rales. No chest tenderness. Normal chest wall expansion. Poor deep inspiratory effort - tight airways.   Abdominal: soft, non-tender, nondistended, bowel sounds audible x 4 quadrants. No palpable hepatosplenomegaly, no tympany  : Non distended bladder.   Extremities and skin: no edema noted, no rashes. Intact skin  Neurological: Lethargic. No tics, normal sensation to pressure and light touch.  moving all 4 extremities symmetrically.    Laboratory / Imaging results reviewed. Last labs done on 2/27/2024    Current Medications: All medications reviewed and updated in Nursing Home eMAR.    Please note: This note was completed in part utilizing a voice-recognition software may have been used in the preparation of this document. Grammatical errors, random word insertion, spelling mistakes, and incomplete sentences may be an occasional consequence of the system secondary to software limitations, ambient noise and hardware issues. Occasional wrong word or \"sound-alike\" substitutions may have occurred due to the inherent limitations of voice recognition software. At the time of dictation, efforts were made to edit, clarify and/or correct errors. Interpretation should be guided by context. Please read the " chart carefully and recognize, using context, where substitutions have occurred. If you have any questions or concerns about the context, text or information contained within the body of this dictation, please contact myself, the provider, for further clarification.      SHYAM Garcia  7/2/2024

## 2024-07-02 NOTE — ASSESSMENT & PLAN NOTE
Noted this morning by nursing  When patient is at baseline, patient is alert, verbal and engaged  Noted respiratory symptoms: rhinorrhea, nasal congestion. No respiratory distress  Start STAT work-up: CXR (2 views), CMP, CBC with diff and Respiratory panel  Check UA with C&S  V/S daily x 5 days

## 2024-07-08 ENCOUNTER — NURSING HOME VISIT (OUTPATIENT)
Dept: GERIATRICS | Facility: OTHER | Age: 84
End: 2024-07-08
Payer: MEDICARE

## 2024-07-08 DIAGNOSIS — G30.1 LATE ONSET ALZHEIMER'S DEMENTIA WITHOUT BEHAVIORAL DISTURBANCE (HCC): ICD-10-CM

## 2024-07-08 DIAGNOSIS — F02.80 LATE ONSET ALZHEIMER'S DEMENTIA WITHOUT BEHAVIORAL DISTURBANCE (HCC): ICD-10-CM

## 2024-07-08 DIAGNOSIS — N13.8 BPH WITH OBSTRUCTION/LOWER URINARY TRACT SYMPTOMS: ICD-10-CM

## 2024-07-08 DIAGNOSIS — N40.1 BPH WITH OBSTRUCTION/LOWER URINARY TRACT SYMPTOMS: ICD-10-CM

## 2024-07-08 DIAGNOSIS — E87.1 HYPONATREMIA: ICD-10-CM

## 2024-07-08 DIAGNOSIS — J18.9 PNEUMONIA OF RIGHT LOWER LOBE DUE TO INFECTIOUS ORGANISM: Primary | ICD-10-CM

## 2024-07-08 DIAGNOSIS — G93.41 ACUTE METABOLIC ENCEPHALOPATHY: ICD-10-CM

## 2024-07-08 DIAGNOSIS — K59.04 CHRONIC IDIOPATHIC CONSTIPATION: ICD-10-CM

## 2024-07-08 DIAGNOSIS — D64.9 ANEMIA, UNSPECIFIED TYPE: ICD-10-CM

## 2024-07-08 PROBLEM — R41.82 ALTERED MENTAL STATUS: Status: ACTIVE | Noted: 2024-01-14

## 2024-07-08 PROCEDURE — 99305 1ST NF CARE MODERATE MDM 35: CPT | Performed by: INTERNAL MEDICINE

## 2024-07-08 RX ORDER — SENNA AND DOCUSATE SODIUM 50; 8.6 MG/1; MG/1
1 TABLET, FILM COATED ORAL
COMMUNITY

## 2024-07-08 RX ORDER — FERROUS SULFATE 325(65) MG
325 TABLET ORAL
COMMUNITY

## 2024-07-08 RX ORDER — IPRATROPIUM BROMIDE AND ALBUTEROL SULFATE 2.5; .5 MG/3ML; MG/3ML
3 SOLUTION RESPIRATORY (INHALATION) EVERY 4 HOURS PRN
COMMUNITY
End: 2024-07-19

## 2024-07-08 NOTE — ASSESSMENT & PLAN NOTE
Continue alfuzosin  Continue monitoring symptoms  UA was not concerning for UTI in the hospital

## 2024-07-08 NOTE — ASSESSMENT & PLAN NOTE
Treated with IV antibiotics  Improving   Now on oral antibiotics  Will complete the course  Continue prn duoneb  Continue monitoring vitals and saturation  Continue with encouraging deep breaths  CT of chest in the hospital reported to have right lower lobe ground glass possibly from pneumonia  Viral panel negative in the hospital   Evaluated by speech and recommended regular with thin liquids   Blood cultures were negative

## 2024-07-08 NOTE — ASSESSMENT & PLAN NOTE
Continue aricept  Continue with supportive care  Continue with safety measures  Continue monitoring symptoms  Continue with encouraging oral intake  Continue monitoring sleep cycle/reorientation and redirection  Continues to require LTCF

## 2024-07-08 NOTE — PROGRESS NOTES
[facility] notes  LONG TERM CARE READMISSION      NAME: Roque Adhikari  AGE: 84 y.o. SEX: male    DATE OF ENCOUNTER: 7/8/2024    Assessment and Plan   Pneumonia of right lower lobe due to infectious organism  Treated with IV antibiotics  Improving   Now on oral antibiotics  Will complete the course  Continue prn duoneb  Continue monitoring vitals and saturation  Continue with encouraging deep breaths  CT of chest in the hospital reported to have right lower lobe ground glass possibly from pneumonia  Viral panel negative in the hospital   Evaluated by speech and recommended regular with thin liquids   Blood cultures were negative     Acute metabolic encephalopathy  Secondary to infection seems to be better  Continue with supportive care  Continue with redirection  Also contribution from his dementia  Continue to monitoring symptoms  Encourage oral take     BPH with obstruction/lower urinary tract symptoms  Continue alfuzosin  Continue monitoring symptoms  UA was not concerning for UTI in the hospital       Chronic idiopathic constipation  Medications adjusted  Now has senna S added on top of miralax   Continue monitoring symptoms   Continue with bowel protocol     Hyponatremia  Sodium last one was 136 but prior to that low  Will repeat BMP       Late onset Alzheimer's dementia without behavioral disturbance (HCC)  Continue aricept  Continue with supportive care  Continue with safety measures  Continue monitoring symptoms  Continue with encouraging oral intake  Continue monitoring sleep cycle/reorientation and redirection  Continues to require LTCF    Anemia  Last hb 10.9  No active sign of bleeding  Was started on iron   Will repeat CBC   Continue monitoring for signs of bleeding           Chief Complaint     Unable to get due to dementia  Information obtained from staff taking care of patient       History of Present Illness     85 yo male seen for readmission to [facility] after  hospitalization. Patient cannot give history due to his dementia. History obtained from nursing staff and CRNP who was present when patient was sent to the hospital. As per information obtained patient was having fever and was lethargic at which time required at which point he was sent to the hospital. In the hospital he was found to have possible sepsis due to pneumonia. Patient was managed with IV antibiotics and once stable was discharged on oral antibiotics back to [facility]. Reviewed labs and imaging reports from the hospital records.     PMHx     Past Medical History:   Diagnosis Date    Acute prostatitis     Arthritis     BPH with obstruction/lower urinary tract symptoms     Cervicalgia     Dementia (HCC)     Dysuria     Erectile dysfunction     Feeling of incomplete bladder emptying     Frequency of urination     Nocturia     Urinary retention      Past Surgical History:   Procedure Laterality Date    CATARACT EXTRACTION      HERNIA REPAIR  1998    HIP SURGERY  2019     Family History   Problem Relation Age of Onset    Pneumonia Mother     Heart attack Father      Social History     Socioeconomic History    Marital status: /Civil Union     Spouse name: None    Number of children: None    Years of education: None    Highest education level: None   Occupational History    None   Tobacco Use    Smoking status: Former     Current packs/day: 0.00     Types: Cigarettes     Quit date: 1972     Years since quittin.5    Smokeless tobacco: Never   Vaping Use    Vaping status: Never Used   Substance and Sexual Activity    Alcohol use: Yes    Drug use: No    Sexual activity: None   Other Topics Concern    None   Social History Narrative    None     Social Determinants of Health     Financial Resource Strain: Patient Declined (7/3/2024)    Received from [facility]    Overall Financial Resource Strain (CARDIA)     Difficulty of Paying Living Expenses: Patient declined   Food  Insecurity: No Food Insecurity (7/3/2024)    Received from [facility]    Hunger Vital Sign     Worried About Running Out of Food in the Last Year: Never true     Ran Out of Food in the Last Year: Never true   Transportation Needs: No Transportation Needs (7/3/2024)    Received from [facility]    PRAPARE - Transportation     Lack of Transportation (Medical): No     Lack of Transportation (Non-Medical): No   Physical Activity: Not on file   Stress: Not on file   Social Connections: Not on file   Intimate Partner Violence: Patient Declined (7/3/2024)    Received from [facility]    Humiliation, Afraid, Rape, and Kick questionnaire     Fear of Current or Ex-Partner: Patient declined     Emotionally Abused: Patient declined     Physically Abused: Patient declined     Sexually Abused: Patient declined   Housing Stability: Low Risk  (7/3/2024)    Received from [facility]    Housing Stability Vital Sign     Unable to Pay for Housing in the Last Year: No     Number of Times Moved in the Last Year: 1     Homeless in the Last Year: No     No Known Allergies    Review of Systems     Denies any pain or shortness of breath   All other review of system negative but limited due to dementia         Objective   Vital signs:  BP: 115/72  HR: 77  RR: 20  TEMP: 98.8F  SAT.: 95% on RA    PHYSICAL EXAM:  GENERAL: no acute distress  SKIN: warm, dry, no rash, no cyanosis  HEENT: normocephalic, atraumatic, no JVD, no Thyromegaly, no lymphadenopathy  LUNGS: CTA, no wheezing, no rales, expanded equally, no chest tenderness   HEART: normal rhythm, normal rate, no murmur, no gallop  ABDOMEN: soft non tender non distended bs+, no guarding or rebound tenderness  :  no suprapubic tenderness  MUSCULOSKELETAL: strength about 4+/5 all extremities, no calf tenderness  NEUROLOGY: awake, alert, CN2-12 intact.   PSYCH: cooperative, pleasant.       Pertinent Laboratory/Diagnostic  Studies:  Recent labs and diagnostic tests reviewed in nursing home EMR    Current Medications   Medications reviewed and signed off on nursing home EMR.        OZZIE PICKETT MD

## 2024-07-08 NOTE — ASSESSMENT & PLAN NOTE
Secondary to infection seems to be better  Continue with supportive care  Continue with redirection  Also contribution from his dementia  Continue to monitoring symptoms  Encourage oral take

## 2024-07-08 NOTE — ASSESSMENT & PLAN NOTE
Last hb 10.9  No active sign of bleeding  Was started on iron   Will repeat CBC   Continue monitoring for signs of bleeding

## 2024-07-08 NOTE — ASSESSMENT & PLAN NOTE
Medications adjusted  Now has senna S added on top of miralax   Continue monitoring symptoms   Continue with bowel protocol

## 2024-08-07 PROBLEM — J18.9 PNEUMONIA OF RIGHT LOWER LOBE DUE TO INFECTIOUS ORGANISM: Status: RESOLVED | Noted: 2024-01-16 | Resolved: 2024-08-07

## 2024-08-19 ENCOUNTER — NURSING HOME VISIT (OUTPATIENT)
Dept: GERIATRICS | Facility: OTHER | Age: 84
End: 2024-08-19
Payer: MEDICARE

## 2024-08-19 DIAGNOSIS — N13.8 BPH WITH OBSTRUCTION/LOWER URINARY TRACT SYMPTOMS: ICD-10-CM

## 2024-08-19 DIAGNOSIS — N40.1 BPH WITH OBSTRUCTION/LOWER URINARY TRACT SYMPTOMS: ICD-10-CM

## 2024-08-19 DIAGNOSIS — R26.2 AMBULATORY DYSFUNCTION: ICD-10-CM

## 2024-08-19 DIAGNOSIS — G30.1 LATE ONSET ALZHEIMER'S DEMENTIA WITHOUT BEHAVIORAL DISTURBANCE (HCC): Primary | ICD-10-CM

## 2024-08-19 DIAGNOSIS — K59.04 CHRONIC IDIOPATHIC CONSTIPATION: ICD-10-CM

## 2024-08-19 DIAGNOSIS — F02.80 LATE ONSET ALZHEIMER'S DEMENTIA WITHOUT BEHAVIORAL DISTURBANCE (HCC): Primary | ICD-10-CM

## 2024-08-19 PROCEDURE — 99309 SBSQ NF CARE MODERATE MDM 30: CPT | Performed by: INTERNAL MEDICINE

## 2024-08-19 NOTE — ASSESSMENT & PLAN NOTE
Multifactorial  Gets around in the wheelchair  Continue with safety measures  Continue with fall precautions  Hx of falls

## 2024-08-19 NOTE — PROGRESS NOTES
[facility] notes  LONG TERM CARE       NAME: Roque Adhikari  AGE: 84 y.o. SEX: male    DATE OF ENCOUNTER: 8/19/2024    Assessment and Plan   Late onset Alzheimer's dementia without behavioral disturbance (HCC)  Continues to slowly decline  Continue aricept  Continues to require help with ADLs  Continue with safety measures  Continue with fall precautions  Continue to encourage fluids and oral intake       BPH with obstruction/lower urinary tract symptoms  No complaints of problem  Continue alfuzosin  Continue monitoring symptoms     Chronic idiopathic constipation  Had an episode of diarrhea today  Asked staff to hold meds if continues to have diarrhea  Continue miralax and senna S   Continue monitoring bowels     Ambulatory dysfunction  Multifactorial  Gets around in the wheelchair  Continue with safety measures  Continue with fall precautions  Hx of falls         Chief Complaint     Diarrhea today morning     History of Present Illness     85 yo male seen for monthly visit and med renewal. Patient seen for dementia, BPH, constipation and ambulatory dysfunction. Reviewed nursing notes since last visit.     PMHx     Past Medical History:   Diagnosis Date    Acute prostatitis     Arthritis     BPH with obstruction/lower urinary tract symptoms     Cervicalgia     Dementia (HCC)     Dysuria     Erectile dysfunction     Feeling of incomplete bladder emptying     Frequency of urination     Nocturia     Urinary retention      Past Surgical History:   Procedure Laterality Date    CATARACT EXTRACTION      HERNIA REPAIR  1998    HIP SURGERY  06/2019     Family History   Problem Relation Age of Onset    Pneumonia Mother     Heart attack Father      Social History     Socioeconomic History    Marital status: /Civil Union     Spouse name: Not on file    Number of children: Not on file    Years of education: Not on file    Highest education level: Not on file   Occupational History    Not on file    Tobacco Use    Smoking status: Former     Current packs/day: 0.00     Types: Cigarettes     Quit date: 1972     Years since quittin.6    Smokeless tobacco: Never   Vaping Use    Vaping status: Never Used   Substance and Sexual Activity    Alcohol use: Yes    Drug use: No    Sexual activity: Not on file   Other Topics Concern    Not on file   Social History Narrative    Not on file     Social Determinants of Health     Financial Resource Strain: Patient Declined (7/3/2024)    Received from [facility]    Overall Financial Resource Strain (CARDIA)     Difficulty of Paying Living Expenses: Patient declined   Food Insecurity: No Food Insecurity (7/3/2024)    Received from [facility]    Hunger Vital Sign     Worried About Running Out of Food in the Last Year: Never true     Ran Out of Food in the Last Year: Never true   Transportation Needs: No Transportation Needs (7/3/2024)    Received from [facility]    PRAPARE - Transportation     Lack of Transportation (Medical): No     Lack of Transportation (Non-Medical): No   Physical Activity: Not on file   Stress: Not on file   Social Connections: Not on file   Intimate Partner Violence: Patient Declined (7/3/2024)    Received from [facility]    Humiliation, Afraid, Rape, and Kick questionnaire     Fear of Current or Ex-Partner: Patient declined     Emotionally Abused: Patient declined     Physically Abused: Patient declined     Sexually Abused: Patient declined   Housing Stability: Low Risk  (7/3/2024)    Received from [facility]    Housing Stability Vital Sign     Unable to Pay for Housing in the Last Year: No     Number of Times Moved in the Last Year: 1     Homeless in the Last Year: No     No Known Allergies    Review of Systems     Diarrhea x 1  Denies any pain or shortness of breath   All other review of system negative but hx limited due to his dementia       Objective    Vital signs reviewed from facility records.     PHYSICAL EXAM:  GENERAL: no acute distress  SKIN: warm, dry, no rash, no cyanosis  HEENT: normocephalic, atraumatic, no JVD, no Thyromegaly, no lymphadenopathy  LUNGS: CTA, no wheezing, no rales, expanded equally, no chest tenderness   HEART: normal rhythm, normal rate, no murmur, no gallop  ABDOMEN: soft non tender non distended bs+, no guarding or rebound tenderness  :  no suprapubic tenderness  MUSCULOSKELETAL: strength about 4+/5 all extremities, ROM within normal, no calf tenderness  NEUROLOGY: awake, alert, CN2-12 intact.   PSYCH: cooperative, pleasant.       Pertinent Laboratory/Diagnostic Studies:  Recent labs and diagnostic tests reviewed in nursing home EMR    Current Medications   Medications reviewed and signed off on nursing home EMR.    Prescriptions drug management - Patient prescriptions sent to pharmacy via renewal in [employer] system       OZZIE PICKETT MD

## 2024-08-19 NOTE — ASSESSMENT & PLAN NOTE
Had an episode of diarrhea today  Asked staff to hold meds if continues to have diarrhea  Continue miralax and senna S   Continue monitoring bowels

## 2024-08-19 NOTE — ASSESSMENT & PLAN NOTE
Continues to slowly decline  Continue aricept  Continues to require help with ADLs  Continue with safety measures  Continue with fall precautions  Continue to encourage fluids and oral intake

## 2024-09-16 ENCOUNTER — NURSING HOME VISIT (OUTPATIENT)
Dept: GERIATRICS | Facility: OTHER | Age: 84
End: 2024-09-16
Payer: MEDICARE

## 2024-09-16 DIAGNOSIS — I73.9 PAD (PERIPHERAL ARTERY DISEASE) (HCC): ICD-10-CM

## 2024-09-16 DIAGNOSIS — F02.80 LATE ONSET ALZHEIMER'S DEMENTIA WITHOUT BEHAVIORAL DISTURBANCE (HCC): Primary | ICD-10-CM

## 2024-09-16 DIAGNOSIS — G30.1 LATE ONSET ALZHEIMER'S DEMENTIA WITHOUT BEHAVIORAL DISTURBANCE (HCC): Primary | ICD-10-CM

## 2024-09-16 DIAGNOSIS — D64.9 ANEMIA, UNSPECIFIED TYPE: ICD-10-CM

## 2024-09-16 DIAGNOSIS — E78.2 MIXED HYPERLIPIDEMIA: ICD-10-CM

## 2024-09-16 PROCEDURE — 99309 SBSQ NF CARE MODERATE MDM 30: CPT | Performed by: INTERNAL MEDICINE

## 2024-09-16 RX ORDER — FLUTICASONE PROPIONATE 50 MCG
1 SPRAY, SUSPENSION (ML) NASAL DAILY
COMMUNITY

## 2024-09-16 NOTE — PROGRESS NOTES
[facility] notes  LONG TERM CARE       NAME: Roque Adhikari  AGE: 84 y.o. SEX: male    DATE OF ENCOUNTER: 2024    Assessment and Plan   Late onset Alzheimer's dementia without behavioral disturbance (HCC)  Continues to slowly decline  Continue aricept  Continues to require help with ADLs  Continue with safety measures  Continue with fall precautions  Continue to encourage fluids and oral intake       PAD (peripheral artery disease) (HCC)  No complaints  Seems to be stable  Not on meds  Continue monitoring symptoms     Mixed hyperlipidemia  Will get a lipid panel this week not on statins      Anemia  Hb 11.8 on 24  Currently no signs of bleeding  Continue monitoring CBC         Chief Complaint     No new complaints     History of Present Illness     85 yo male seen for monthly visit and med renewal. Patient seen for dementia, PAD, HLD and anemia. Reviewed nursing notes since last visit.     PMHx     Past Medical History:   Diagnosis Date    Acute prostatitis     Arthritis     BPH with obstruction/lower urinary tract symptoms     Cervicalgia     Dementia (HCC)     Dysuria     Erectile dysfunction     Feeling of incomplete bladder emptying     Frequency of urination     Nocturia     Urinary retention      Past Surgical History:   Procedure Laterality Date    CATARACT EXTRACTION      HERNIA REPAIR  1998    HIP SURGERY  2019     Family History   Problem Relation Age of Onset    Pneumonia Mother     Heart attack Father      Social History     Socioeconomic History    Marital status: /Civil Union     Spouse name: Not on file    Number of children: Not on file    Years of education: Not on file    Highest education level: Not on file   Occupational History    Not on file   Tobacco Use    Smoking status: Former     Current packs/day: 0.00     Types: Cigarettes     Quit date: 1972     Years since quittin.7    Smokeless tobacco: Never   Vaping Use    Vaping status: Never Used    Substance and Sexual Activity    Alcohol use: Yes    Drug use: No    Sexual activity: Not on file   Other Topics Concern    Not on file   Social History Narrative    Not on file     Social Determinants of Health     Financial Resource Strain: Patient Declined (7/3/2024)    Received from [facility]    Overall Financial Resource Strain (CARDIA)     Difficulty of Paying Living Expenses: Patient declined   Food Insecurity: No Food Insecurity (7/3/2024)    Received from [facility]    Hunger Vital Sign     Worried About Running Out of Food in the Last Year: Never true     Ran Out of Food in the Last Year: Never true   Transportation Needs: No Transportation Needs (7/3/2024)    Received from [facility]    PRAPARE - Transportation     Lack of Transportation (Medical): No     Lack of Transportation (Non-Medical): No   Physical Activity: Not on file   Stress: Not on file   Social Connections: Not on file   Intimate Partner Violence: Patient Declined (7/3/2024)    Received from [facility]    Humiliation, Afraid, Rape, and Kick questionnaire     Fear of Current or Ex-Partner: Patient declined     Emotionally Abused: Patient declined     Physically Abused: Patient declined     Sexually Abused: Patient declined   Housing Stability: Low Risk  (7/3/2024)    Received from [facility]    Housing Stability Vital Sign     Unable to Pay for Housing in the Last Year: No     Number of Times Moved in the Last Year: 1     Homeless in the Last Year: No     No Known Allergies    Review of Systems     Denies any pain or shortness of breath  All other review of system negative but hx limited due to dementia       Objective   Vital signs reviewed from facility records    PHYSICAL EXAM:  GENERAL: no acute distress  SKIN: warm, dry, no rash, no cyanosis  HEENT: normocephalic, atraumatic, no JVD, no Thyromegaly, no lymphadenopathy  LUNGS: CTA, no wheezing, no  rales, expanded equally, no chest tenderness   HEART: normal rhythm, normal rate, no murmur, no gallop  ABDOMEN: soft non tender non distended bs+, no guarding or rebound tenderness  :  no suprapubic tenderness  MUSCULOSKELETAL: strength about 4+/5 all extremities, ROM within normal, no calf tenderness  NEUROLOGY: awake, alert, CN2-12 intact.   PSYCH: cooperative, pleasant.       Pertinent Laboratory/Diagnostic Studies:  Recent labs and diagnostic tests reviewed in nursing home EMR    Current Medications   Medications reviewed and signed off on nursing home EMR.  Prescriptions drug management - Patient prescriptions sent to pharmacy via renewal in [employer] system       OZZIE PICKETT MD

## 2024-10-24 ENCOUNTER — NURSING HOME VISIT (OUTPATIENT)
Dept: GERIATRICS | Facility: OTHER | Age: 84
End: 2024-10-24
Payer: MEDICARE

## 2024-10-24 DIAGNOSIS — N13.8 BPH WITH OBSTRUCTION/LOWER URINARY TRACT SYMPTOMS: Primary | ICD-10-CM

## 2024-10-24 DIAGNOSIS — F51.01 PRIMARY INSOMNIA: ICD-10-CM

## 2024-10-24 DIAGNOSIS — G89.29 CHRONIC LOW BACK PAIN, UNSPECIFIED BACK PAIN LATERALITY, UNSPECIFIED WHETHER SCIATICA PRESENT: ICD-10-CM

## 2024-10-24 DIAGNOSIS — R26.2 AMBULATORY DYSFUNCTION: ICD-10-CM

## 2024-10-24 DIAGNOSIS — K59.04 CHRONIC IDIOPATHIC CONSTIPATION: ICD-10-CM

## 2024-10-24 DIAGNOSIS — N40.1 BPH WITH OBSTRUCTION/LOWER URINARY TRACT SYMPTOMS: Primary | ICD-10-CM

## 2024-10-24 DIAGNOSIS — M54.50 CHRONIC LOW BACK PAIN, UNSPECIFIED BACK PAIN LATERALITY, UNSPECIFIED WHETHER SCIATICA PRESENT: ICD-10-CM

## 2024-10-24 PROCEDURE — 99309 SBSQ NF CARE MODERATE MDM 30: CPT | Performed by: INTERNAL MEDICINE

## 2024-10-24 NOTE — PROGRESS NOTES
[facility] notes  LONG TERM CARE       NAME: Roque Adhikari  AGE: 84 y.o. SEX: male    DATE OF ENCOUNTER: 10/24/2024    Assessment and Plan   BPH with obstruction/lower urinary tract symptoms  Denies any symptoms  Continue with alfuzosin  Continue monitoring symptoms  Continue with supportive care     Chronic idiopathic constipation  No reported issues  Continue with miralax and senna S  Continue with bowel protocol     Low back pain  Seems stable on tylenol and lidocaine patch   Continue with activity as tolerable  Continue monitoring symptoms   Continue with supportive care     Ambulatory dysfunction  Multifactorial  Continue with safety measures  Continue with fall precautions   Hx of falls  Gets around in the wheelchair     Primary insomnia  No reported issues  Continue with melatonin   Continue monitoring symptoms         Chief Complaint     No new complaints     History of Present Illness     83 yo male seen for monthly visit and med renewal. Patient seen for BPH, constipation, low back pain, ambulatory dysfunction and insomnia. Reviewed nursing notes since last visit.     PMHx     Past Medical History:   Diagnosis Date    Acute prostatitis     Arthritis     BPH with obstruction/lower urinary tract symptoms     Cervicalgia     Dementia (HCC)     Dysuria     Erectile dysfunction     Feeling of incomplete bladder emptying     Frequency of urination     Nocturia     Urinary retention      Past Surgical History:   Procedure Laterality Date    CATARACT EXTRACTION      HERNIA REPAIR  1998    HIP SURGERY  06/2019     Family History   Problem Relation Age of Onset    Pneumonia Mother     Heart attack Father      Social History     Socioeconomic History    Marital status: /Civil Union     Spouse name: Not on file    Number of children: Not on file    Years of education: Not on file    Highest education level: Not on file   Occupational History    Not on file   Tobacco Use    Smoking status:  Former     Current packs/day: 0.00     Types: Cigarettes     Quit date: 1972     Years since quittin.8    Smokeless tobacco: Never   Vaping Use    Vaping status: Never Used   Substance and Sexual Activity    Alcohol use: Yes    Drug use: No    Sexual activity: Not on file   Other Topics Concern    Not on file   Social History Narrative    Not on file     Social Determinants of Health     Financial Resource Strain: Patient Declined (7/3/2024)    Received from [facility]    Overall Financial Resource Strain (CARDIA)     Difficulty of Paying Living Expenses: Patient declined   Food Insecurity: No Food Insecurity (7/3/2024)    Received from [facility]    Hunger Vital Sign     Worried About Running Out of Food in the Last Year: Never true     Ran Out of Food in the Last Year: Never true   Transportation Needs: No Transportation Needs (7/3/2024)    Received from [facility]    PRAPARE - Transportation     Lack of Transportation (Medical): No     Lack of Transportation (Non-Medical): No   Physical Activity: Not on file   Stress: Not on file   Social Connections: Not on file   Intimate Partner Violence: Patient Declined (7/3/2024)    Received from [facility]    Humiliation, Afraid, Rape, and Kick questionnaire     Fear of Current or Ex-Partner: Patient declined     Emotionally Abused: Patient declined     Physically Abused: Patient declined     Sexually Abused: Patient declined   Housing Stability: Low Risk  (7/3/2024)    Received from [facility]    Housing Stability Vital Sign     Unable to Pay for Housing in the Last Year: No     Number of Times Moved in the Last Year: 1     Homeless in the Last Year: No     No Known Allergies    Review of Systems     Denies any pain or shortness of breath  All other review of system negative but hx limited due to dementia       Objective   Vital signs reviewed from facility records.      PHYSICAL EXAM:  GENERAL: no acute distress  SKIN: warm, dry, no rash, no cyanosis  HEENT: normocephalic, atraumatic, no JVD, no Thyromegaly, no lymphadenopathy  LUNGS: CTA, no wheezing, no rales, expanded equally, no chest tenderness   HEART: normal rhythm, normal rate, no murmur, no gallop  ABDOMEN: soft non tender non distended bs+, no guarding or rebound tenderness  :  no suprapubic tenderness  MUSCULOSKELETAL: strength about 4+/5 all extremities, ROM within normal, no calf tenderness  NEUROLOGY: awake, alert, CN2-12 intact.   PSYCH: cooperative, pleasant.       Pertinent Laboratory/Diagnostic Studies:  Recent labs and diagnostic tests reviewed in nursing home EMR    Current Medications   Medications reviewed and signed off on nursing home EMR.  Prescriptions drug management - Patient prescriptions sent to pharmacy via renewal in [employer] system       OZZIE PICKETT MD

## 2024-10-24 NOTE — ASSESSMENT & PLAN NOTE
Denies any symptoms  Continue with alfuzosin  Continue monitoring symptoms  Continue with supportive care

## 2024-10-24 NOTE — ASSESSMENT & PLAN NOTE
Multifactorial  Continue with safety measures  Continue with fall precautions   Hx of falls  Gets around in the wheelchair

## 2024-10-24 NOTE — ASSESSMENT & PLAN NOTE
Seems stable on tylenol and lidocaine patch   Continue with activity as tolerable  Continue monitoring symptoms   Continue with supportive care

## 2024-11-18 ENCOUNTER — NURSING HOME VISIT (OUTPATIENT)
Dept: GERIATRICS | Facility: OTHER | Age: 84
End: 2024-11-18
Payer: MEDICARE

## 2024-11-18 DIAGNOSIS — F02.80 LATE ONSET ALZHEIMER'S DEMENTIA WITHOUT BEHAVIORAL DISTURBANCE (HCC): Primary | ICD-10-CM

## 2024-11-18 DIAGNOSIS — E55.9 VITAMIN D DEFICIENCY: ICD-10-CM

## 2024-11-18 DIAGNOSIS — G30.1 LATE ONSET ALZHEIMER'S DEMENTIA WITHOUT BEHAVIORAL DISTURBANCE (HCC): Primary | ICD-10-CM

## 2024-11-18 DIAGNOSIS — M19.90 ARTHRITIS: ICD-10-CM

## 2024-11-18 DIAGNOSIS — N13.8 BPH WITH OBSTRUCTION/LOWER URINARY TRACT SYMPTOMS: ICD-10-CM

## 2024-11-18 DIAGNOSIS — F51.01 PRIMARY INSOMNIA: ICD-10-CM

## 2024-11-18 DIAGNOSIS — N40.1 BPH WITH OBSTRUCTION/LOWER URINARY TRACT SYMPTOMS: ICD-10-CM

## 2024-11-18 PROBLEM — R41.89 COGNITIVE DECLINE: Status: RESOLVED | Noted: 2022-10-14 | Resolved: 2024-11-18

## 2024-11-18 PROCEDURE — 99309 SBSQ NF CARE MODERATE MDM 30: CPT | Performed by: INTERNAL MEDICINE

## 2024-11-18 NOTE — ASSESSMENT & PLAN NOTE
Seems stable  Continue with alfuzosin  Continue monitoring symptoms  Continue with supportive care

## 2024-11-18 NOTE — PROGRESS NOTES
[facility] notes  LONG TERM CARE       NAME: Roque Adhikari  AGE: 84 y.o. SEX: male    DATE OF ENCOUNTER: 11/18/2024    Assessment and Plan   Late onset Alzheimer's dementia without behavioral disturbance (HCC)  Continues to slowly decline  Continue aricept  Continues to require help with ADLs  Continue with safety measures  Continue with fall precautions  Continue to encourage fluids and oral intake       Primary insomnia  No reported issues  Continue with melatonin  Continue monitoring symptoms     Arthritis  Continue with tylenol scheduled and lidocaine patch  Continue with activities as tolerable     Vitamin D deficiency  Continue with vitamin D   Will check level next year     BPH with obstruction/lower urinary tract symptoms  Seems stable  Continue with alfuzosin  Continue monitoring symptoms  Continue with supportive care         Chief Complaint     No new complaints     History of Present Illness     83 yo male seen for monthly visit and med renewal. Patient seen for dementia, insomnia, arthritis, vit D deficiency and BPH. Reviewed nursing notes since last visit.     PMHx     Past Medical History:   Diagnosis Date    Acute prostatitis     Arthritis     BPH with obstruction/lower urinary tract symptoms     Cervicalgia     Dementia (HCC)     Dysuria     Erectile dysfunction     Feeling of incomplete bladder emptying     Frequency of urination     Nocturia     Urinary retention      Past Surgical History:   Procedure Laterality Date    CATARACT EXTRACTION      HERNIA REPAIR  1998    HIP SURGERY  06/2019     Family History   Problem Relation Age of Onset    Pneumonia Mother     Heart attack Father      Social History     Socioeconomic History    Marital status: /Civil Union     Spouse name: Not on file    Number of children: Not on file    Years of education: Not on file    Highest education level: Not on file   Occupational History    Not on file   Tobacco Use    Smoking status:  Former     Current packs/day: 0.00     Types: Cigarettes     Quit date: 1972     Years since quittin.9    Smokeless tobacco: Never   Vaping Use    Vaping status: Never Used   Substance and Sexual Activity    Alcohol use: Yes    Drug use: No    Sexual activity: Not on file   Other Topics Concern    Not on file   Social History Narrative    Not on file     Social Drivers of Health     Financial Resource Strain: Patient Declined (7/3/2024)    Received from [facility]    Overall Financial Resource Strain (CARDIA)     Difficulty of Paying Living Expenses: Patient declined   Food Insecurity: No Food Insecurity (7/3/2024)    Received from [facility]    Hunger Vital Sign     Worried About Running Out of Food in the Last Year: Never true     Ran Out of Food in the Last Year: Never true   Transportation Needs: No Transportation Needs (7/3/2024)    Received from [facility]    PRAPARE - Transportation     Lack of Transportation (Medical): No     Lack of Transportation (Non-Medical): No   Physical Activity: Not on file   Stress: Not on file   Social Connections: Not on file   Intimate Partner Violence: Patient Declined (7/3/2024)    Received from [facility]    Humiliation, Afraid, Rape, and Kick questionnaire     Fear of Current or Ex-Partner: Patient declined     Emotionally Abused: Patient declined     Physically Abused: Patient declined     Sexually Abused: Patient declined   Housing Stability: Low Risk  (7/3/2024)    Received from [facility]    Housing Stability Vital Sign     Unable to Pay for Housing in the Last Year: No     Number of Times Moved in the Last Year: 1     Homeless in the Last Year: No     No Known Allergies    Review of Systems     Denies any pain or shortness of breath   All other review of system negative      Objective   Vital signs reviewed from facility records.     PHYSICAL EXAM:  GENERAL: no acute  distress  SKIN: warm, dry, no rash, no cyanosis  HEENT: normocephalic, atraumatic, no JVD, no Thyromegaly, no lymphadenopathy  LUNGS: CTA, no wheezing, no rales, expanded equally, no chest tenderness   HEART: normal rhythm, normal rate, no murmur, no gallop  ABDOMEN: soft non tender non distended bs+, no guarding or rebound tenderness  :  no suprapubic tenderness  MUSCULOSKELETAL: strength about 4+/5 all extremities, ROM within normal, no calf tenderness  NEUROLOGY: awake, alert, CN2-12 intact.   PSYCH: cooperative, pleasant.       Pertinent Laboratory/Diagnostic Studies:  Recent labs and diagnostic tests reviewed in nursing home EMR    Current Medications   Medications reviewed and signed off on nursing home EMR.  Prescriptions drug management - Patient prescriptions sent to pharmacy via renewal in [facility]      OZZIE PICKETT MD

## 2024-12-11 ENCOUNTER — NURSING HOME VISIT (OUTPATIENT)
Dept: GERIATRICS | Facility: OTHER | Age: 84
End: 2024-12-11
Payer: MEDICARE

## 2024-12-11 DIAGNOSIS — I73.9 PAD (PERIPHERAL ARTERY DISEASE) (HCC): Primary | ICD-10-CM

## 2024-12-11 DIAGNOSIS — G89.29 CHRONIC LOW BACK PAIN, UNSPECIFIED BACK PAIN LATERALITY, UNSPECIFIED WHETHER SCIATICA PRESENT: ICD-10-CM

## 2024-12-11 DIAGNOSIS — F02.80 LATE ONSET ALZHEIMER'S DEMENTIA WITHOUT BEHAVIORAL DISTURBANCE (HCC): ICD-10-CM

## 2024-12-11 DIAGNOSIS — R26.2 AMBULATORY DYSFUNCTION: ICD-10-CM

## 2024-12-11 DIAGNOSIS — K59.04 CHRONIC IDIOPATHIC CONSTIPATION: ICD-10-CM

## 2024-12-11 DIAGNOSIS — M54.50 CHRONIC LOW BACK PAIN, UNSPECIFIED BACK PAIN LATERALITY, UNSPECIFIED WHETHER SCIATICA PRESENT: ICD-10-CM

## 2024-12-11 DIAGNOSIS — G30.1 LATE ONSET ALZHEIMER'S DEMENTIA WITHOUT BEHAVIORAL DISTURBANCE (HCC): ICD-10-CM

## 2024-12-11 PROCEDURE — 99309 SBSQ NF CARE MODERATE MDM 30: CPT | Performed by: INTERNAL MEDICINE

## 2024-12-11 NOTE — ASSESSMENT & PLAN NOTE
Continues to slowly decline  Continue donepezil   Continues to require help with ADLs  Continue with safety measures  Continue with fall precautions  Continue to encourage fluids and oral intake

## 2024-12-11 NOTE — ASSESSMENT & PLAN NOTE
No reported issues  Continue with senna S and miralax  Continue monitoring bowels  Continue with bowel protocol

## 2024-12-11 NOTE — PROGRESS NOTES
[facility] notes  LONG TERM CARE       NAME: Roque Adhikari  AGE: 84 y.o. SEX: male    DATE OF ENCOUNTER: 12/11/2024    Assessment and Plan   PAD (peripheral artery disease) (HCC)  No complaints   Seems to be stable   Currently not on meds  Continue monitoring symptoms     Low back pain  Seems to be stable  Continue with lidocaine patch and tylenol   Continue monitoring symptoms   Continue with activity as tolerable     Chronic idiopathic constipation  No reported issues  Continue with senna S and miralax  Continue monitoring bowels  Continue with bowel protocol     Ambulatory dysfunction  Multifactorial  Also due to poor safety awareness  Continue with safety measures  Continue with fall precautions  Gets around in the wheelchair with staff assistance     Late onset Alzheimer's dementia without behavioral disturbance (HCC)  Continues to slowly decline  Continue donepezil   Continues to require help with ADLs  Continue with safety measures  Continue with fall precautions  Continue to encourage fluids and oral intake           Chief Complaint     No new complaints     History of Present Illness     85 yo male seen for monthly visit and med renewal. Patient seen for PAD, low back pain, constipation, ambulatory dysfunction and dementia. Reviewed nursing notes since last visit.     PMHx     Past Medical History:   Diagnosis Date    Acute prostatitis     Arthritis     BPH with obstruction/lower urinary tract symptoms     Cervicalgia     Dementia (HCC)     Dysuria     Erectile dysfunction     Feeling of incomplete bladder emptying     Frequency of urination     Nocturia     Urinary retention      Past Surgical History:   Procedure Laterality Date    CATARACT EXTRACTION      HERNIA REPAIR  1998    HIP SURGERY  06/2019     Family History   Problem Relation Age of Onset    Pneumonia Mother     Heart attack Father      Social History     Socioeconomic History    Marital status: /Civil Union      Spouse name: Not on file    Number of children: Not on file    Years of education: Not on file    Highest education level: Not on file   Occupational History    Not on file   Tobacco Use    Smoking status: Former     Current packs/day: 0.00     Types: Cigarettes     Quit date: 1972     Years since quittin.9    Smokeless tobacco: Never   Vaping Use    Vaping status: Never Used   Substance and Sexual Activity    Alcohol use: Yes    Drug use: No    Sexual activity: Not on file   Other Topics Concern    Not on file   Social History Narrative    Not on file     Social Drivers of Health     Financial Resource Strain: Patient Declined (7/3/2024)    Received from [facility]    Overall Financial Resource Strain (CARDIA)     Difficulty of Paying Living Expenses: Patient declined   Food Insecurity: No Food Insecurity (7/3/2024)    Received from [facility]    Hunger Vital Sign     Worried About Running Out of Food in the Last Year: Never true     Ran Out of Food in the Last Year: Never true   Transportation Needs: No Transportation Needs (7/3/2024)    Received from [facility]    PRAPARE - Transportation     Lack of Transportation (Medical): No     Lack of Transportation (Non-Medical): No   Physical Activity: Not on file   Stress: Not on file   Social Connections: Not on file   Intimate Partner Violence: Patient Declined (7/3/2024)    Received from [facility]    Humiliation, Afraid, Rape, and Kick questionnaire     Fear of Current or Ex-Partner: Patient declined     Emotionally Abused: Patient declined     Physically Abused: Patient declined     Sexually Abused: Patient declined   Housing Stability: Low Risk  (7/3/2024)    Received from [facility]    Housing Stability Vital Sign     Unable to Pay for Housing in the Last Year: No     Number of Times Moved in the Last Year: 1     Homeless in the Last Year: No     No Known  Allergies    Review of Systems     Denies any pain or shortness of breath  All other review of system negative but hx limited due to his dementia       Objective   Vital signs reviewed from facility records.     PHYSICAL EXAM:  GENERAL: no acute distress  SKIN: warm, dry, no rash, no cyanosis  HEENT: normocephalic, atraumatic, no JVD, no Thyromegaly, no lymphadenopathy  LUNGS: CTA, no wheezing, no rales, expanded equally, no chest tenderness   HEART: normal rhythm, normal rate, no murmur, no gallop  ABDOMEN: soft non tender non distended bs+, no guarding or rebound tenderness  :  no suprapubic tenderness  MUSCULOSKELETAL: strength about 4+/5 all extremities, ROM within normal, no calf tenderness  NEUROLOGY: awake, alert, CN2-12 intact.   PSYCH: cooperative, pleasant.       Pertinent Laboratory/Diagnostic Studies:  Recent labs and diagnostic tests reviewed in nursing home EMR    Current Medications   Medications reviewed and signed off on nursing home EMR.  Prescriptions drug management - Patient prescriptions sent to pharmacy via renewal in [facility]      OZZIE PICKETT MD

## 2024-12-11 NOTE — ASSESSMENT & PLAN NOTE
Multifactorial  Also due to poor safety awareness  Continue with safety measures  Continue with fall precautions  Gets around in the wheelchair with staff assistance

## 2024-12-11 NOTE — ASSESSMENT & PLAN NOTE
Seems to be stable  Continue with lidocaine patch and tylenol   Continue monitoring symptoms   Continue with activity as tolerable

## 2025-01-15 ENCOUNTER — NURSING HOME VISIT (OUTPATIENT)
Dept: GERIATRICS | Facility: OTHER | Age: 85
End: 2025-01-15
Payer: MEDICARE

## 2025-01-15 DIAGNOSIS — N40.1 BPH WITH OBSTRUCTION/LOWER URINARY TRACT SYMPTOMS: Primary | ICD-10-CM

## 2025-01-15 DIAGNOSIS — F51.01 PRIMARY INSOMNIA: ICD-10-CM

## 2025-01-15 DIAGNOSIS — E55.9 VITAMIN D DEFICIENCY: ICD-10-CM

## 2025-01-15 DIAGNOSIS — N13.8 BPH WITH OBSTRUCTION/LOWER URINARY TRACT SYMPTOMS: Primary | ICD-10-CM

## 2025-01-15 DIAGNOSIS — M50.30 DDD (DEGENERATIVE DISC DISEASE), CERVICAL: ICD-10-CM

## 2025-01-15 DIAGNOSIS — D64.9 ANEMIA, UNSPECIFIED TYPE: ICD-10-CM

## 2025-01-15 PROCEDURE — 99309 SBSQ NF CARE MODERATE MDM 30: CPT | Performed by: INTERNAL MEDICINE

## 2025-01-15 NOTE — PROGRESS NOTES
[facility] notes  LONG TERM CARE       NAME: Roque Adhikari  AGE: 84 y.o. SEX: male    DATE OF ENCOUNTER: 1/15/2025    Assessment and Plan   BPH with obstruction/lower urinary tract symptoms  No reported issues  Continue with alfuzosin  Continue monitoring symptoms  Continue with assistance to go to the bathroom     DDD (degenerative disc disease), cervical  Continue with tylenol  Continue with activity as tolerable  Seems to be stable  Continue with supportive care     Primary insomnia  No reported issues as per staff  Continue with melatonin  Continue monitoring symptoms    Anemia  Hb stable last one was 11.9 on 24  No signs of bleeding  Continue monitoring CBC     Vitamin D deficiency  Continue to vitamin D supplement         Chief Complaint     No new complaints     History of Present Illness     85 yo male seen for monthly visit and med renewal. Patient seen for BPH, DDD, insomnia, anemia and vit D. Reviewed nursing notes since last visit.     PMHx     Past Medical History:   Diagnosis Date    Acute prostatitis     Arthritis     BPH with obstruction/lower urinary tract symptoms     Cervicalgia     Dementia (HCC)     Dysuria     Erectile dysfunction     Feeling of incomplete bladder emptying     Frequency of urination     Nocturia     Urinary retention      Past Surgical History:   Procedure Laterality Date    CATARACT EXTRACTION      HERNIA REPAIR  1998    HIP SURGERY  2019     Family History   Problem Relation Age of Onset    Pneumonia Mother     Heart attack Father      Social History     Socioeconomic History    Marital status: /Civil Union     Spouse name: Not on file    Number of children: Not on file    Years of education: Not on file    Highest education level: Not on file   Occupational History    Not on file   Tobacco Use    Smoking status: Former     Current packs/day: 0.00     Types: Cigarettes     Quit date: 1972     Years since quittin.0    Smokeless  tobacco: Never   Vaping Use    Vaping status: Never Used   Substance and Sexual Activity    Alcohol use: Yes    Drug use: No    Sexual activity: Not on file   Other Topics Concern    Not on file   Social History Narrative    Not on file     Social Drivers of Health     Financial Resource Strain: Patient Declined (7/3/2024)    Received from [facility]    Overall Financial Resource Strain (CARDIA)     Difficulty of Paying Living Expenses: Patient declined   Food Insecurity: No Food Insecurity (7/3/2024)    Received from [facility]    Hunger Vital Sign     Worried About Running Out of Food in the Last Year: Never true     Ran Out of Food in the Last Year: Never true   Transportation Needs: No Transportation Needs (7/3/2024)    Received from [facility]    PRAPARE - Transportation     Lack of Transportation (Medical): No     Lack of Transportation (Non-Medical): No   Physical Activity: Not on file   Stress: Not on file   Social Connections: Not on file   Intimate Partner Violence: Patient Declined (7/3/2024)    Received from [facility]    Humiliation, Afraid, Rape, and Kick questionnaire     Fear of Current or Ex-Partner: Patient declined     Emotionally Abused: Patient declined     Physically Abused: Patient declined     Sexually Abused: Patient declined   Housing Stability: Low Risk  (7/3/2024)    Received from [facility]    Housing Stability Vital Sign     Unable to Pay for Housing in the Last Year: No     Number of Times Moved in the Last Year: 1     Homeless in the Last Year: No     No Known Allergies    Review of Systems     Denies any pain or shortness of breath  All other review of system negative but hx limited due to dementia       Objective   Vital signs reviewed from facility records.     PHYSICAL EXAM:  GENERAL: no acute distress  SKIN: warm, dry, no rash, no cyanosis  HEENT: normocephalic, atraumatic, no JVD, no  Thyromegaly, no lymphadenopathy  LUNGS: CTA, no wheezing, no rales, expanded equally, no chest tenderness   HEART: normal rhythm, normal rate, no murmur, no gallop  ABDOMEN: soft non tender non distended bs+, no guarding or rebound tenderness  :  no suprapubic tenderness  MUSCULOSKELETAL: strength about 4+/5 all extremities, ROM within normal,  no calf tenderness  NEUROLOGY: awake, alert, CN2-12 intact.   PSYCH: cooperative, pleasant.       Pertinent Laboratory/Diagnostic Studies:  Recent labs and diagnostic tests reviewed in nursing home EMR    Current Medications   Medications reviewed and signed off on nursing home EMR.  Prescriptions drug management - Patient prescriptions sent to pharmacy via renewal in [facility]      OZZIE PICKETT MD

## 2025-01-15 NOTE — ASSESSMENT & PLAN NOTE
Continue to vitamin D supplement   
Continue with tylenol  Continue with activity as tolerable  Seems to be stable  Continue with supportive care   
Hb stable last one was 11.9 on 11/21/24  No signs of bleeding  Continue monitoring CBC   
No reported issues  Continue with alfuzosin  Continue monitoring symptoms  Continue with assistance to go to the bathroom   
No reported issues as per staff  Continue with melatonin  Continue monitoring symptoms  
Offered and patient declined

## 2025-01-20 NOTE — ASSESSMENT & PLAN NOTE
Problem: OCCUPATIONAL THERAPY ADULT  Goal: Performs self-care activities at highest level of function for planned discharge setting.  See evaluation for individualized goals.  Description: Treatment Interventions: ADL retraining, Functional transfer training, Endurance training, Patient/family training, Equipment evaluation/education, Compensatory technique education, Continued evaluation, Energy conservation, Activityengagement          See flowsheet documentation for full assessment, interventions and recommendations.   Note: Limitation: Decreased ADL status, Decreased endurance, Decreased self-care trans, Decreased high-level ADLs  Prognosis: Good  Assessment: Pt is a 58 y.o. female seen for OT evaluation s/p adm to [facility] on 1/20/2025 w/ Primary osteoarthritis of right knee . Comorbidities affecting pt’s functional performance include a significant PMH of anxiety, asthma, chronic pain disorder, DM, HTN, hyperlipidemia, PONV, ROBYN, acute respiratory insufficiency, L TKA revision (10/22/2024). Pt with active OT orders and activity orders for Activity beginning POD #0. WBAT RLE. Pt lives in a one level house with 5 MATTHEW. Pt has tub/shower with transfer bench and standard toilets. At baseline, pt was independent with all ADLs/IADLs. Pt completed supine to sit with S. Pt completed don of underwear and pants with S then standing to pull over waist with S. Pt completed functional mobility functional household distance with use of RW. Pt completed toileting/toilet transfer with S and use of RW and grab bars for safety. Pt educated on ADLs/IADLs, transfers, and LE management for independence at home. Upon evaluation, pt currently requires S for UB ADLs, S for LB ADLs, S for toileting, S for bed mobility, S for functional mobility, and S for transfers 2* the following deficits impacting occupational performance: weakness, decreased strength , decreased balance, decreased activity tolerance, increased pain, and  Not on statins  Last lipid last August - good range   Continue monitoring lipid panel    orthopedic restrictions. These impairments, as well at pt’s personal factors of: MATTHEW home environment, difficulty performing ADLs, difficulty performing IADLs, difficulty performing transfers/mobility, WBS, fall risk , and new use of AD for functional transfers/mobility limit pt’s ability to safely engage in all baseline areas of occupation. Based on the aforementioned OT evaluation, functional performance deficits, and assessments, pt has been identified as a high complexity evaluation. Pt to continue to benefit from continued acute OT services during hospital stay to address defined deficits and to maximize level of functional independence in the following Occupational Performance areas: grooming, bathing/shower, toilet hygiene, dressing, health maintenance, functional mobility, community mobility, clothing management, cleaning, household maintenance, and job performance/volunteering. From OT standpoint, recommend No post-acute rehabilitation needs upon D/C. OT will continue to follow pt 3-5x/wk.     Rehab Resource Intensity Level, OT: No post-acute rehabilitation needs

## 2025-02-19 ENCOUNTER — NURSING HOME VISIT (OUTPATIENT)
Dept: GERIATRICS | Facility: OTHER | Age: 85
End: 2025-02-19
Payer: MEDICARE

## 2025-02-19 DIAGNOSIS — F02.80 LATE ONSET ALZHEIMER'S DEMENTIA WITHOUT BEHAVIORAL DISTURBANCE (HCC): Primary | ICD-10-CM

## 2025-02-19 DIAGNOSIS — R26.2 AMBULATORY DYSFUNCTION: ICD-10-CM

## 2025-02-19 DIAGNOSIS — I73.9 PAD (PERIPHERAL ARTERY DISEASE) (HCC): ICD-10-CM

## 2025-02-19 DIAGNOSIS — K59.04 CHRONIC IDIOPATHIC CONSTIPATION: ICD-10-CM

## 2025-02-19 DIAGNOSIS — M19.90 ARTHRITIS: ICD-10-CM

## 2025-02-19 DIAGNOSIS — G30.1 LATE ONSET ALZHEIMER'S DEMENTIA WITHOUT BEHAVIORAL DISTURBANCE (HCC): Primary | ICD-10-CM

## 2025-02-19 PROCEDURE — 99309 SBSQ NF CARE MODERATE MDM 30: CPT | Performed by: INTERNAL MEDICINE

## 2025-02-19 NOTE — ASSESSMENT & PLAN NOTE
Seems stable  Continue with tylenol scheduled and lidocaine patch  Continue with activity as tolerable

## 2025-02-19 NOTE — ASSESSMENT & PLAN NOTE
Seems stable  Continue with senna S and miralax  Continue with bowel protocol  Continue monitoring symptoms

## 2025-02-19 NOTE — PROGRESS NOTES
[facility] notes  LONG TERM CARE       NAME: Roque Adhikari  AGE: 85 y.o. SEX: male    DATE OF ENCOUNTER: 2/19/2025    Assessment and Plan   Late onset Alzheimer's dementia without behavioral disturbance (HCC)  Continues to slowly decline  Continue donepezil   Continues to require help with ADLs  Continue with safety measures  Continue with fall precautions  Continue to encourage fluids and oral intake       Chronic idiopathic constipation  Seems stable  Continue with senna S and miralax  Continue with bowel protocol  Continue monitoring symptoms     Ambulatory dysfunction  Multifactorial  Continue with safety measures  Continue with fall precautions   Gets around in the wheelchair with staff assistance  Continue with restorative care     Arthritis  Seems stable  Continue with tylenol scheduled and lidocaine patch  Continue with activity as tolerable     PAD (peripheral artery disease) (HCC)  No complaints   Seems to be stable  Continue with monitoring symptoms  Currently not on meds           Chief Complaint     No new complaints but hx limited due to dementia     History of Present Illness     86 yo male seen for monthly visit and med renewal. Patient seen for dementia, constipation, ambulatory dysfunction, arthritis and PAD. Reviewed nursing notes since last visit.     PMHx     Past Medical History:   Diagnosis Date    Acute prostatitis     Arthritis     BPH with obstruction/lower urinary tract symptoms     Cervicalgia     Dementia (HCC)     Dysuria     Erectile dysfunction     Feeling of incomplete bladder emptying     Frequency of urination     Nocturia     Urinary retention      Past Surgical History:   Procedure Laterality Date    CATARACT EXTRACTION      HERNIA REPAIR  1998    HIP SURGERY  06/2019     Family History   Problem Relation Age of Onset    Pneumonia Mother     Heart attack Father      Social History     Socioeconomic History    Marital status: /Civil Union     Spouse  name: Not on file    Number of children: Not on file    Years of education: Not on file    Highest education level: Not on file   Occupational History    Not on file   Tobacco Use    Smoking status: Former     Current packs/day: 0.00     Types: Cigarettes     Quit date: 1972     Years since quittin.1    Smokeless tobacco: Never   Vaping Use    Vaping status: Never Used   Substance and Sexual Activity    Alcohol use: Yes    Drug use: No    Sexual activity: Not on file   Other Topics Concern    Not on file   Social History Narrative    Not on file     Social Drivers of Health     Financial Resource Strain: Patient Declined (7/3/2024)    Received from [facility]    Overall Financial Resource Strain (CARDIA)     Difficulty of Paying Living Expenses: Patient declined   Food Insecurity: No Food Insecurity (7/3/2024)    Received from [facility]    Hunger Vital Sign     Worried About Running Out of Food in the Last Year: Never true     Ran Out of Food in the Last Year: Never true   Transportation Needs: No Transportation Needs (7/3/2024)    Received from [facility]    PRAPARE - Transportation     Lack of Transportation (Medical): No     Lack of Transportation (Non-Medical): No   Physical Activity: Not on file   Stress: Not on file   Social Connections: Not on file   Intimate Partner Violence: Patient Declined (7/3/2024)    Received from [facility]    Humiliation, Afraid, Rape, and Kick questionnaire     Fear of Current or Ex-Partner: Patient declined     Emotionally Abused: Patient declined     Physically Abused: Patient declined     Sexually Abused: Patient declined   Housing Stability: Low Risk  (7/3/2024)    Received from [facility]    Housing Stability Vital Sign     Unable to Pay for Housing in the Last Year: No     Number of Times Moved in the Last Year: 1     Homeless in the Last Year: No     No Known  Allergies    Review of Systems     Denies any pain or shortness of breath   All other review of system negative but hx limited due to dementia       Objective   Vital signs reviewed from facility records.     PHYSICAL EXAM:  GENERAL: no acute distress  SKIN: warm, dry, no rash, no cyanosis  HEENT: normocephalic, atraumatic, no JVD, no Thyromegaly, no lymphadenopathy  LUNGS: CTA, no wheezing, no rales, expanded equally, no chest tenderness   HEART: normal rhythm, normal rate, no murmur, no gallop  ABDOMEN: soft non tender non distended bs+, no guarding or rebound tenderness  :  no suprapubic tenderness  MUSCULOSKELETAL: strength about 4+/5 all extremities,  no calf tenderness  NEUROLOGY: awake, alert, CN2-12 intact.   PSYCH: cooperative, pleasant.       Pertinent Laboratory/Diagnostic Studies:  Recent labs and diagnostic tests reviewed in nursing home EMR    Current Medications   Medications reviewed and signed off on nursing home EMR.  Prescriptions drug management - Patient prescriptions sent to pharmacy via renewal in [facility]      OZZIE PICKETT MD

## 2025-02-19 NOTE — ASSESSMENT & PLAN NOTE
Multifactorial  Continue with safety measures  Continue with fall precautions   Gets around in the wheelchair with staff assistance  Continue with restorative care

## 2025-03-04 ENCOUNTER — NURSING HOME VISIT (OUTPATIENT)
Dept: GERIATRICS | Facility: OTHER | Age: 85
End: 2025-03-04
Payer: MEDICARE

## 2025-03-04 DIAGNOSIS — J06.9 UPPER RESPIRATORY TRACT INFECTION, UNSPECIFIED TYPE: Primary | ICD-10-CM

## 2025-03-04 PROCEDURE — 99308 SBSQ NF CARE LOW MDM 20: CPT | Performed by: NURSE PRACTITIONER

## 2025-03-04 NOTE — PROGRESS NOTES
[facility]  [address] [place] [address], [address], [place] [address]  [phone]    NAME: Roque Adhikari  AGE: 85 y.o. SEX: male    Progress Note    Location: [facility]  POS: 32    Assessment/Plan:    URI (upper respiratory infection)  Nursing reported lethargy, poor appetite and active coughing at breakfast today  (+) moist productive cough, (+) tight airways, (+) wheezing (+) nasal congestion w/ rhinorrhea  Start STAT CXR (2 views)  Start Duoneb TID x 7 days  Labs on 3/5/2025: CBC with diff and BMP  V/S daily x 5 days  Nursing to continue to monitor for new or worsening of symptoms    Chief complaint / Reason for visit: Acute Visit    History of Present Illness:  This is an 85-year-old male patient residing at [facility].  Patient is seen and examined today per nursing request for lethargy, active coughing and poor appetite this morning. Patient is OOB today sitting in manual wheelchair in room - asleep but easily awakened -cooperative, calm, very pleasant and not in distress.  Patient reported coughing and nasal congestion -denies sore throat, dysphagia, odynophagia, SOB or chest pain. No hypoxia on RA. Noted tight airway exchange: wheezing and rhonchi.  Patient with possible sick contacts.    Nursing and prior provider notes reviewed on this visit. Discussed visit with PCP and nursing staff/ supervisor.    Review of Systems:  Per history of present illness, all other systems reviewed and negative.    HISTORY:  Medical Hx: Reviewed, unchanged  Family Hx: Reviewed, unchanged  Soc Hx: Reviewed,  unchanged    ALLERGY: Reviewed, unchanged. No Known Allergies     PHYSICAL EXAM:  Vital Signs: T 98.0F -HR 88 -R 19 -BP: 125/72 (3/3/2025) -SpO2 98% RA  Weight: 214.9 lbs (2/5/2025)    General: Fatigued presentation. Well appearing. No acute distress  Head: Atraumatic. Normocephalic.  Eye Exam: anicteric sclera, no discharge, PERRLA, No injection  Oral Exam: moist mucous membranes, slight erythema  "to buccaloropharyngeal erythema, palatine tonsils WNL.  Nose: rhinorrhea  Neck Exam: no anterior cervical lymphadenopathy noted, neck supple. Trachea midline, no carotid bruit, no masses  Cardiovascular: regular rate, regular rhythm, no: murmurs/ rubs/ gallops. S1 and S2 appreciated.  Pulmonary: (+) wheeze, (+) rhonchi, no rales. No chest tenderness. Normal chest wall expansion. Active coughing.  Abdominal: soft, non-tender, nondistended, bowel sounds audible x 4 quadrants. No palpable hepatosplenomegaly, no tympany  : Non distended bladder.   Extremities and skin: no edema noted, no rashes. Intact skin  Neurological: alert, cooperative and responsive, Oriented x 3. No tics, normal sensation to pressure and light touch.  moving all 4 extremities symmetrically.    Laboratory / Imaging results reviewed. Last labs done on Nov. 2024    Current Medications: All medications reviewed and updated in Nursing Home eMAR.    Please note: This note was completed in part utilizing a voice-recognition software may have been used in the preparation of this document. Grammatical errors, random word insertion, spelling mistakes, and incomplete sentences may be an occasional consequence of the system secondary to software limitations, ambient noise and hardware issues. Occasional wrong word or \"sound-alike\" substitutions may have occurred due to the inherent limitations of voice recognition software. At the time of dictation, efforts were made to edit, clarify and/or correct errors. Interpretation should be guided by context. Please read the chart carefully and recognize, using context, where substitutions have occurred. If you have any questions or concerns about the context, text or information contained within the body of this dictation, please contact myself, the provider, for further clarification.      SHYAM Garcia  3/4/2025  "

## 2025-03-04 NOTE — ASSESSMENT & PLAN NOTE
Nursing reported lethargy, poor appetite and active coughing at breakfast today  (+) moist productive cough, (+) tight airways, (+) wheezing (+) nasal congestion w/ rhinorrhea  Start STAT CXR (2 views)  Start Duoneb TID x 7 days  Labs on 3/5/2025: CBC with diff and BMP  V/S daily x 5 days  Nursing to continue to monitor for new or worsening of symptoms

## 2025-03-12 ENCOUNTER — NURSING HOME VISIT (OUTPATIENT)
Dept: GERIATRICS | Facility: OTHER | Age: 85
End: 2025-03-12
Payer: MEDICARE

## 2025-03-12 DIAGNOSIS — F02.80 LATE ONSET ALZHEIMER'S DEMENTIA WITHOUT BEHAVIORAL DISTURBANCE (HCC): ICD-10-CM

## 2025-03-12 DIAGNOSIS — F51.01 PRIMARY INSOMNIA: ICD-10-CM

## 2025-03-12 DIAGNOSIS — G30.1 LATE ONSET ALZHEIMER'S DEMENTIA WITHOUT BEHAVIORAL DISTURBANCE (HCC): ICD-10-CM

## 2025-03-12 DIAGNOSIS — N13.8 BPH WITH OBSTRUCTION/LOWER URINARY TRACT SYMPTOMS: Primary | ICD-10-CM

## 2025-03-12 DIAGNOSIS — N40.1 BPH WITH OBSTRUCTION/LOWER URINARY TRACT SYMPTOMS: Primary | ICD-10-CM

## 2025-03-12 DIAGNOSIS — D64.9 ANEMIA, UNSPECIFIED TYPE: ICD-10-CM

## 2025-03-12 DIAGNOSIS — Z71.89 ACP (ADVANCE CARE PLANNING): ICD-10-CM

## 2025-03-12 PROBLEM — L84 CALLUS: Status: ACTIVE | Noted: 2022-09-29

## 2025-03-12 PROBLEM — L60.3 DYSTROPHIC NAIL: Status: ACTIVE | Noted: 2022-09-29

## 2025-03-12 PROCEDURE — 99309 SBSQ NF CARE MODERATE MDM 30: CPT | Performed by: INTERNAL MEDICINE

## 2025-03-12 NOTE — ASSESSMENT & PLAN NOTE
Continues to slowly decline  Continue donepezil   Continues to require help with ADLs  Continue with safety measures  Continue with fall precautions  Continue to encourage fluids and oral intake  Requiring LTCF

## 2025-03-12 NOTE — ASSESSMENT & PLAN NOTE
Hb stable at 11.9 on 3/5/25  Continue with iron supplement   Continue monitoring for bleeding   Currently no signs of bleeding

## 2025-03-12 NOTE — PROGRESS NOTES
[facility] notes  LONG TERM CARE       NAME: Roque Adhikari  AGE: 85 y.o. SEX: male    DATE OF ENCOUNTER: 3/12/2025    Assessment and Plan   BPH with obstruction/lower urinary tract symptoms  Seems stable  No reported new issues  Continue with alfuzosin  Continue monitoring symptoms      Anemia  Hb stable at 11.9 on 3/5/25  Continue with iron supplement   Continue monitoring for bleeding   Currently no signs of bleeding     Late onset Alzheimer's dementia without behavioral disturbance (HCC)  Continues to slowly decline  Continue donepezil   Continues to require help with ADLs  Continue with safety measures  Continue with fall precautions  Continue to encourage fluids and oral intake  Requiring LTCF       Primary insomnia  Seems stable  No reported issues  Continue with melatonin  Continue with monitoring symptoms     ACP (advance care planning)  Patient is DNR   Patient cannot give history due to dementia   Called talked to daughter about living will and reports she has it and will bring it next visit.         Chief Complaint     No new complaints but limited by his cognition     History of Present Illness     84 yo male seen for monthly visit and med renewal. Patient seen for BPH, anemia, dementia, insomnia and ACP. Reviewed nursing notes since last visit.     PMHx     Past Medical History:   Diagnosis Date    Acute prostatitis     Arthritis     BPH with obstruction/lower urinary tract symptoms     Cervicalgia     Dementia (HCC)     Dysuria     Erectile dysfunction     Feeling of incomplete bladder emptying     Frequency of urination     Nocturia     Urinary retention      Past Surgical History:   Procedure Laterality Date    CATARACT EXTRACTION      HERNIA REPAIR  1998    HIP SURGERY  06/2019     Family History   Problem Relation Age of Onset    Pneumonia Mother     Heart attack Father      Social History     Socioeconomic History    Marital status: /Civil Union     Spouse name: Not on  file    Number of children: Not on file    Years of education: Not on file    Highest education level: Not on file   Occupational History    Not on file   Tobacco Use    Smoking status: Former     Current packs/day: 0.00     Types: Cigarettes     Quit date: 1972     Years since quittin.2    Smokeless tobacco: Never   Vaping Use    Vaping status: Never Used   Substance and Sexual Activity    Alcohol use: Yes    Drug use: No    Sexual activity: Not on file   Other Topics Concern    Not on file   Social History Narrative    Not on file     Social Drivers of Health     Financial Resource Strain: Patient Declined (7/3/2024)    Received from [facility]    Overall Financial Resource Strain (CARDIA)     Difficulty of Paying Living Expenses: Patient declined   Food Insecurity: No Food Insecurity (7/3/2024)    Received from [facility]    Hunger Vital Sign     Worried About Running Out of Food in the Last Year: Never true     Ran Out of Food in the Last Year: Never true   Transportation Needs: No Transportation Needs (7/3/2024)    Received from [facility]    PRAPARE - Transportation     Lack of Transportation (Medical): No     Lack of Transportation (Non-Medical): No   Physical Activity: Not on file   Stress: Not on file   Social Connections: Not on file   Intimate Partner Violence: Patient Declined (7/3/2024)    Received from [facility]    Humiliation, Afraid, Rape, and Kick questionnaire     Fear of Current or Ex-Partner: Patient declined     Emotionally Abused: Patient declined     Physically Abused: Patient declined     Sexually Abused: Patient declined   Housing Stability: Low Risk  (7/3/2024)    Received from [facility]    Housing Stability Vital Sign     Unable to Pay for Housing in the Last Year: No     Number of Times Moved in the Last Year: 1     Homeless in the Last Year: No     No Known Allergies    Review of Systems      Unable to get due to dementia  Information obtained from staff taking care of patient      Objective   Vital signs reviewed from facility records.     PHYSICAL EXAM:  GENERAL: no acute distress  SKIN: warm, dry, no rash, no cyanosis  HEENT: normocephalic, atraumatic, no JVD, no Thyromegaly, no lymphadenopathy  LUNGS: CTA, no wheezing, no rales, expanded equally, no chest tenderness   HEART: normal rhythm, normal rate, no murmur, no gallop  ABDOMEN: soft non tender non distended bs+, no guarding or rebound tenderness  :  no suprapubic tenderness  MUSCULOSKELETAL: strength about 4+/5 all extremities, ROM within normal,  no calf tenderness  NEUROLOGY: awake, alert, CN2-12 intact.   PSYCH: cooperative, pleasant.       Pertinent Laboratory/Diagnostic Studies:  Recent labs and diagnostic tests reviewed in nursing home EMR    Current Medications   Medications reviewed and signed off on nursing home EMR.  Prescriptions drug management - Patient prescriptions sent to pharmacy via renewal in [facility]      OZZIE PICKETT MD

## 2025-03-12 NOTE — ASSESSMENT & PLAN NOTE
Patient is DNR   Patient cannot give history due to dementia   Called talked to daughter about living will and reports she has it and will bring it next visit.

## 2025-04-14 ENCOUNTER — NURSING HOME VISIT (OUTPATIENT)
Dept: GERIATRICS | Facility: OTHER | Age: 85
End: 2025-04-14
Payer: MEDICARE

## 2025-04-14 DIAGNOSIS — F02.80 LATE ONSET ALZHEIMER'S DEMENTIA WITHOUT BEHAVIORAL DISTURBANCE (HCC): Primary | ICD-10-CM

## 2025-04-14 DIAGNOSIS — G30.1 LATE ONSET ALZHEIMER'S DEMENTIA WITHOUT BEHAVIORAL DISTURBANCE (HCC): Primary | ICD-10-CM

## 2025-04-14 DIAGNOSIS — I73.9 PAD (PERIPHERAL ARTERY DISEASE) (HCC): ICD-10-CM

## 2025-04-14 DIAGNOSIS — M19.90 ARTHRITIS: ICD-10-CM

## 2025-04-14 DIAGNOSIS — K59.04 CHRONIC IDIOPATHIC CONSTIPATION: ICD-10-CM

## 2025-04-14 DIAGNOSIS — R26.2 AMBULATORY DYSFUNCTION: ICD-10-CM

## 2025-04-14 PROCEDURE — 99309 SBSQ NF CARE MODERATE MDM 30: CPT | Performed by: INTERNAL MEDICINE

## 2025-04-14 RX ORDER — LORATADINE 10 MG/1
10 TABLET ORAL DAILY
COMMUNITY

## 2025-04-14 NOTE — PROGRESS NOTES
[facility] notes  LONG TERM CARE       NAME: Roque Adhikari  AGE: 85 y.o. SEX: male    DATE OF ENCOUNTER: 4/14/2025    Assessment and Plan   Late onset Alzheimer's dementia without behavioral disturbance (HCC)  Mostly stable   Continue donepezil, vitamin D and vitamin B12  Continues to require help with ADLs  Continue with safety measures  Continue with fall precautions  Continue to encourage fluids and oral intake  Requiring LTCF       Chronic idiopathic constipation  Seems stable  No reported issues  Continue with miralax and senna S   Continue with bowel protocol   Continue monitoring symptoms     Arthritis  Seems stable  Continue with scheduled tylenol and lidocaine patch  Continue activity as tolerable     Ambulatory dysfunction  Multifactorial  Gets around with the wheelchair  Continue with safety measures  Continue with fall precautions     PAD (peripheral artery disease) (HCC)  No complaints   Seems to be stable  Continue with monitoring symptoms   Not on meds at present time other then vitamin b12         Chief Complaint     No new complaints     History of Present Illness     84 yo male seen for monthly visit and med renewal. Patient seen for dementia, constipation, arthritis, ambulatory dysfunction and PAD. Reviewed nursing notes since last visit.     PMHx     Past Medical History:   Diagnosis Date    Acute prostatitis     Arthritis     BPH with obstruction/lower urinary tract symptoms     Cervicalgia     Dementia (HCC)     Dysuria     Erectile dysfunction     Feeling of incomplete bladder emptying     Frequency of urination     Nocturia     Urinary retention      Past Surgical History:   Procedure Laterality Date    CATARACT EXTRACTION      HERNIA REPAIR  1998    HIP SURGERY  06/2019     Family History   Problem Relation Age of Onset    Pneumonia Mother     Heart attack Father      Social History     Socioeconomic History    Marital status: /Civil Union     Spouse name: Not on  file    Number of children: Not on file    Years of education: Not on file    Highest education level: Not on file   Occupational History    Not on file   Tobacco Use    Smoking status: Former     Current packs/day: 0.00     Types: Cigarettes     Quit date: 1972     Years since quittin.3    Smokeless tobacco: Never   Vaping Use    Vaping status: Never Used   Substance and Sexual Activity    Alcohol use: Yes    Drug use: No    Sexual activity: Not on file   Other Topics Concern    Not on file   Social History Narrative    Not on file     Social Drivers of Health     Financial Resource Strain: Patient Declined (7/3/2024)    Received from [facility]    Overall Financial Resource Strain (CARDIA)     Difficulty of Paying Living Expenses: Patient declined   Food Insecurity: No Food Insecurity (7/3/2024)    Received from [facility]    Hunger Vital Sign     Worried About Running Out of Food in the Last Year: Never true     Ran Out of Food in the Last Year: Never true   Transportation Needs: No Transportation Needs (7/3/2024)    Received from [facility]    PRAPARE - Transportation     Lack of Transportation (Medical): No     Lack of Transportation (Non-Medical): No   Physical Activity: Not on file   Stress: Not on file   Social Connections: Not on file   Intimate Partner Violence: Patient Declined (7/3/2024)    Received from [facility]    Humiliation, Afraid, Rape, and Kick questionnaire     Fear of Current or Ex-Partner: Patient declined     Emotionally Abused: Patient declined     Physically Abused: Patient declined     Sexually Abused: Patient declined   Housing Stability: Low Risk  (7/3/2024)    Received from [facility]    Housing Stability Vital Sign     Unable to Pay for Housing in the Last Year: No     Number of Times Moved in the Last Year: 1     Homeless in the Last Year: No     No Known Allergies    Review of Systems      Denies any pain or shortness of breath  All other review of system negative but hx limited due to dementia       Objective   Vital signs reviewed from facility records.     PHYSICAL EXAM:  GENERAL: no acute distress  SKIN: warm, dry, no rash, no cyanosis  HEENT: normocephalic, atraumatic, no JVD, no Thyromegaly, no lymphadenopathy  LUNGS: CTA, no wheezing, no rales, expanded equally, no chest tenderness   HEART: normal rhythm, normal rate, no murmur, no gallop  ABDOMEN: soft non tender non distended bs+, no guarding or rebound tenderness  :  no suprapubic tenderness  MUSCULOSKELETAL: strength about 4+/5 all extremities, ROM within normal,  no calf tenderness  NEUROLOGY: awake, alert, CN2-12 intact.   PSYCH: cooperative, pleasant.       Pertinent Laboratory/Diagnostic Studies:  Recent labs and diagnostic tests reviewed in nursing home EMR    Current Medications   Medications reviewed and signed off on nursing home EMR.  Prescriptions drug management - Patient prescriptions sent to pharmacy via renewal in [facility]      OZZIE PICKETT MD

## 2025-04-14 NOTE — ASSESSMENT & PLAN NOTE
Seems stable  Continue with scheduled tylenol and lidocaine patch  Continue activity as tolerable

## 2025-04-14 NOTE — ASSESSMENT & PLAN NOTE
Multifactorial  Gets around with the wheelchair  Continue with safety measures  Continue with fall precautions

## 2025-04-14 NOTE — ASSESSMENT & PLAN NOTE
Mostly stable   Continue donepezil, vitamin D and vitamin B12  Continues to require help with ADLs  Continue with safety measures  Continue with fall precautions  Continue to encourage fluids and oral intake  Requiring LTCF

## 2025-04-14 NOTE — ASSESSMENT & PLAN NOTE
Seems stable  No reported issues  Continue with miralax and senna S   Continue with bowel protocol   Continue monitoring symptoms

## 2025-04-14 NOTE — ASSESSMENT & PLAN NOTE
No complaints   Seems to be stable  Continue with monitoring symptoms   Not on meds at present time other then vitamin b12

## 2025-05-13 ENCOUNTER — DOCUMENTATION (OUTPATIENT)
Dept: ADMINISTRATIVE | Facility: OTHER | Age: 85
End: 2025-05-13

## 2025-05-13 NOTE — PROGRESS NOTES
05/13/25 12:22 PM    Annual Wellness Visit outreach is not required, patient is living in a nursing home/ long term care facility/ other facility.     Thank you.  Mariama Escobar MA  [facility]

## 2025-05-16 ENCOUNTER — NURSING HOME VISIT (OUTPATIENT)
Dept: GERIATRICS | Facility: OTHER | Age: 85
End: 2025-05-16
Payer: MEDICARE

## 2025-05-16 DIAGNOSIS — M50.30 DDD (DEGENERATIVE DISC DISEASE), CERVICAL: ICD-10-CM

## 2025-05-16 DIAGNOSIS — N13.8 BPH WITH OBSTRUCTION/LOWER URINARY TRACT SYMPTOMS: Primary | ICD-10-CM

## 2025-05-16 DIAGNOSIS — F51.01 PRIMARY INSOMNIA: ICD-10-CM

## 2025-05-16 DIAGNOSIS — D64.9 ANEMIA, UNSPECIFIED TYPE: ICD-10-CM

## 2025-05-16 DIAGNOSIS — G30.1 LATE ONSET ALZHEIMER'S DEMENTIA WITHOUT BEHAVIORAL DISTURBANCE (HCC): ICD-10-CM

## 2025-05-16 DIAGNOSIS — F02.80 LATE ONSET ALZHEIMER'S DEMENTIA WITHOUT BEHAVIORAL DISTURBANCE (HCC): ICD-10-CM

## 2025-05-16 DIAGNOSIS — N40.1 BPH WITH OBSTRUCTION/LOWER URINARY TRACT SYMPTOMS: Primary | ICD-10-CM

## 2025-05-16 PROCEDURE — 99309 SBSQ NF CARE MODERATE MDM 30: CPT | Performed by: INTERNAL MEDICINE

## 2025-05-16 NOTE — ASSESSMENT & PLAN NOTE
Mostly stable   Continue donepezil, vitamin D and vitamin B12  Continues to require help with ADLs  Continue with safety measures  Continue with fall precautions  Continue to encourage fluids and oral intake

## 2025-05-16 NOTE — ASSESSMENT & PLAN NOTE
Continue with tylenol  Continue with activity as tolerable  Mostly stable  Continue with safety measures

## 2025-05-16 NOTE — ASSESSMENT & PLAN NOTE
Last hb 11.9   No signs of bleeding  Will continue monitoring CBC as scheduled unless any acute symptoms

## 2025-05-16 NOTE — PROGRESS NOTES
[facility] notes  LONG TERM CARE       NAME: Roque Adhikari  AGE: 85 y.o. SEX: male    DATE OF ENCOUNTER: 5/16/2025    Assessment and Plan     BPH with obstruction/lower urinary tract symptoms    No reported issues  Seems to be stable  Continue with alfuzosin  Continue monitoring symptoms     Anemia    Last hb 11.9   No signs of bleeding  Will continue monitoring CBC as scheduled unless any acute symptoms     DDD (degenerative disc disease), cervical    Continue with tylenol  Continue with activity as tolerable  Mostly stable  Continue with safety measures     Late onset Alzheimer's dementia without behavioral disturbance (HCC)    Mostly stable   Continue donepezil, vitamin D and vitamin B12  Continues to require help with ADLs  Continue with safety measures  Continue with fall precautions  Continue to encourage fluids and oral intake       Primary insomnia    Seems to be stable  No reported issues  Continue with melatonin   Continue monitoring bowels         Chief Complaint     No new complaints but hx limited due to his dementia     History of Present Illness     84 yo male seen for monthly visit and med renewal. Patient seen for BPH, anemia, DDD, dementia and insomnia. Reviewed nursing notes since last visit.     PMHx     Past Medical History:   Diagnosis Date    Acute prostatitis     Arthritis     BPH with obstruction/lower urinary tract symptoms     Cervicalgia     Dementia (HCC)     Dysuria     Erectile dysfunction     Feeling of incomplete bladder emptying     Frequency of urination     Nocturia     Urinary retention      Past Surgical History:   Procedure Laterality Date    CATARACT EXTRACTION      HERNIA REPAIR  1998    HIP SURGERY  06/2019     Family History   Problem Relation Age of Onset    Pneumonia Mother     Heart attack Father      Social History     Socioeconomic History    Marital status: /Civil Union     Spouse name: Not on file    Number of children: Not on file     Years of education: Not on file    Highest education level: Not on file   Occupational History    Not on file   Tobacco Use    Smoking status: Former     Current packs/day: 0.00     Types: Cigarettes     Quit date: 1972     Years since quittin.4    Smokeless tobacco: Never   Vaping Use    Vaping status: Never Used   Substance and Sexual Activity    Alcohol use: Yes    Drug use: No    Sexual activity: Not on file   Other Topics Concern    Not on file   Social History Narrative    Not on file     Social Drivers of Health     Financial Resource Strain: Patient Declined (7/3/2024)    Received from [facility]    Overall Financial Resource Strain (CARDIA)     Difficulty of Paying Living Expenses: Patient declined   Food Insecurity: No Food Insecurity (7/3/2024)    Received from [facility]    Hunger Vital Sign     Worried About Running Out of Food in the Last Year: Never true     Ran Out of Food in the Last Year: Never true   Transportation Needs: No Transportation Needs (7/3/2024)    Received from [facility]    PRAPARE - Transportation     Lack of Transportation (Medical): No     Lack of Transportation (Non-Medical): No   Physical Activity: Not on file   Stress: Not on file   Social Connections: Not on file   Intimate Partner Violence: Patient Declined (7/3/2024)    Received from [facility]    Humiliation, Afraid, Rape, and Kick questionnaire     Fear of Current or Ex-Partner: Patient declined     Emotionally Abused: Patient declined     Physically Abused: Patient declined     Sexually Abused: Patient declined   Housing Stability: Low Risk  (7/3/2024)    Received from [facility]    Housing Stability Vital Sign     Unable to Pay for Housing in the Last Year: No     Number of Times Moved in the Last Year: 1     Homeless in the Last Year: No     Allergies[1]    Review of Systems     Denies any pain or shortness of breath   All  other review of system negative but hx limited due to dementia       Objective   Vital signs reviewed from facility records.     PHYSICAL EXAM:  GENERAL: no acute distress  SKIN: warm, dry, no rash, no cyanosis  HEENT: normocephalic, atraumatic, no JVD, no Thyromegaly, no lymphadenopathy  LUNGS: CTA, no wheezing, no rales, expanded equally, no chest tenderness   HEART: normal rhythm, normal rate, no murmur, no gallop  ABDOMEN: soft non tender non distended bs+, no guarding or rebound tenderness  :  no suprapubic tenderness  MUSCULOSKELETAL: strength about 4+/5 all extremities,  no calf tenderness  NEUROLOGY: awake, alert, CN2-12 intact.   PSYCH: cooperative, pleasant.       Pertinent Laboratory/Diagnostic Studies:  Recent labs and diagnostic tests reviewed in nursing home EMR    Current Medications   Medications reviewed and signed off on nursing home EMR.  Prescriptions drug management - Patient prescriptions sent to pharmacy via renewal in [facility]      OZZIE PICKETT MD         [1] No Known Allergies

## 2025-06-18 ENCOUNTER — NURSING HOME VISIT (OUTPATIENT)
Dept: GERIATRICS | Facility: OTHER | Age: 85
End: 2025-06-18
Payer: MEDICARE

## 2025-06-18 DIAGNOSIS — N40.1 BPH WITH OBSTRUCTION/LOWER URINARY TRACT SYMPTOMS: ICD-10-CM

## 2025-06-18 DIAGNOSIS — F02.80 LATE ONSET ALZHEIMER'S DEMENTIA WITHOUT BEHAVIORAL DISTURBANCE (HCC): Primary | ICD-10-CM

## 2025-06-18 DIAGNOSIS — M19.90 ARTHRITIS: ICD-10-CM

## 2025-06-18 DIAGNOSIS — N13.8 BPH WITH OBSTRUCTION/LOWER URINARY TRACT SYMPTOMS: ICD-10-CM

## 2025-06-18 DIAGNOSIS — R26.2 AMBULATORY DYSFUNCTION: ICD-10-CM

## 2025-06-18 DIAGNOSIS — K59.04 CHRONIC IDIOPATHIC CONSTIPATION: ICD-10-CM

## 2025-06-18 DIAGNOSIS — G30.1 LATE ONSET ALZHEIMER'S DEMENTIA WITHOUT BEHAVIORAL DISTURBANCE (HCC): Primary | ICD-10-CM

## 2025-06-18 PROCEDURE — 99309 SBSQ NF CARE MODERATE MDM 30: CPT | Performed by: INTERNAL MEDICINE

## 2025-06-18 NOTE — ASSESSMENT & PLAN NOTE
No reported issues  Continue with scheduled tylenol and lidocaine patch  Continue monitoring symptoms  Continue with activity as tolerable

## 2025-06-18 NOTE — PROGRESS NOTES
[facility] notes  LONG TERM CARE       NAME: Roque Adhikari  AGE: 85 y.o. SEX: male    DATE OF ENCOUNTER: 6/18/2025    Assessment and Plan   Late onset Alzheimer's dementia without behavioral disturbance (HCC)  Continues very slowly decline  Continue donepezil, vitamin D and vitamin B12   Continues to require help with ADLs  Continue with safety measures  Continue with fall precautions  Continue to encourage fluids and oral intake       Ambulatory dysfunction  Hx of falls  Multifactorial  Continue with safety measures  Continue with fall precautions  Continues to get around the facility in the wheelchair     Arthritis  No reported issues  Continue with scheduled tylenol and lidocaine patch  Continue monitoring symptoms  Continue with activity as tolerable     Chronic idiopathic constipation  Seems stable  Continue with miralax and senna S   Continue with bowel protocol     BPH with obstruction/lower urinary tract symptoms  No reported issues  Continue with alfuzosin  Continue monitoring symptoms         Chief Complaint     No new complaints but hx limited due to his dementia     History of Present Illness     84 yo female seen for monthly visit and med renewal. Patient seen for dementia, ambulatory dysfunction, arthritis, constipation and BPH. Reviewed nursing notes since last visit.     PMHx     Past Medical History[1]  Past Surgical History[2]  Family History[3]  Social History[4]  Allergies[5]    Review of Systems     Unable to get due to dementia  Information obtained from staff taking care of patient      Objective   Vital signs reviewed from facility records.     PHYSICAL EXAM:  GENERAL: no acute distress  SKIN: warm, dry, no rash, no cyanosis  HEENT: normocephalic, atraumatic, no JVD, no Thyromegaly, no lymphadenopathy  LUNGS: CTA, no wheezing, no rales, expanded equally, no chest tenderness   HEART: normal rhythm, normal rate, no murmur, no gallop  ABDOMEN: soft non tender non distended  bs+, no guarding or rebound tenderness  :  no suprapubic tenderness  MUSCULOSKELETAL: strength about 4+/5 all extremities, no calf tenderness  NEUROLOGY: awake, alert, CN2-12 intact.   PSYCH: cooperative, pleasant.       Pertinent Laboratory/Diagnostic Studies:  Recent labs and diagnostic tests reviewed in nursing home EMR    Current Medications   Medications reviewed and signed off on nursing home EMR.  Prescriptions drug management - Patient prescriptions sent to pharmacy via renewal in [facility]      OZZIE PICKETT MD         [1]   Past Medical History:  Diagnosis Date    Acute prostatitis     Arthritis     BPH with obstruction/lower urinary tract symptoms     Cervicalgia     Dementia (HCC)     Dysuria     Erectile dysfunction     Feeling of incomplete bladder emptying     Frequency of urination     Nocturia     Urinary retention    [2]   Past Surgical History:  Procedure Laterality Date    CATARACT EXTRACTION      HERNIA REPAIR  1998    HIP SURGERY  2019   [3]   Family History  Problem Relation Name Age of Onset    Pneumonia Mother      Heart attack Father     [4]   Social History  Socioeconomic History    Marital status: /Civil Union   Tobacco Use    Smoking status: Former     Current packs/day: 0.00     Types: Cigarettes     Quit date: 1972     Years since quittin.4    Smokeless tobacco: Never   Vaping Use    Vaping status: Never Used   Substance and Sexual Activity    Alcohol use: Yes    Drug use: No     Social Drivers of Health     Financial Resource Strain: Patient Declined (7/3/2024)    Received from [facility]    Overall Financial Resource Strain (CARDIA)     Difficulty of Paying Living Expenses: Patient declined   Food Insecurity: No Food Insecurity (7/3/2024)    Received from [facility]    Hunger Vital Sign     Worried About Running Out of Food in the Last Year: Never true     Ran Out of Food in the Last Year: Never true   Transportation Needs: No  Transportation Needs (7/3/2024)    Received from [facility]    PRAPARE - Transportation     Lack of Transportation (Medical): No     Lack of Transportation (Non-Medical): No   Intimate Partner Violence: Patient Declined (7/3/2024)    Received from [facility]    Humiliation, Afraid, Rape, and Kick questionnaire     Fear of Current or Ex-Partner: Patient declined     Emotionally Abused: Patient declined     Physically Abused: Patient declined     Sexually Abused: Patient declined   Housing Stability: Low Risk  (7/3/2024)    Received from [facility]    Housing Stability Vital Sign     Unable to Pay for Housing in the Last Year: No     Number of Times Moved in the Last Year: 1     Homeless in the Last Year: No   [5] No Known Allergies

## 2025-06-18 NOTE — ASSESSMENT & PLAN NOTE
Continues very slowly decline  Continue donepezil, vitamin D and vitamin B12   Continues to require help with ADLs  Continue with safety measures  Continue with fall precautions  Continue to encourage fluids and oral intake

## 2025-06-18 NOTE — ASSESSMENT & PLAN NOTE
Hx of falls  Multifactorial  Continue with safety measures  Continue with fall precautions  Continues to get around the facility in the wheelchair

## 2025-07-17 ENCOUNTER — NURSING HOME VISIT (OUTPATIENT)
Dept: GERIATRICS | Facility: OTHER | Age: 85
End: 2025-07-17
Payer: MEDICARE

## 2025-07-17 DIAGNOSIS — F02.80 LATE ONSET ALZHEIMER'S DEMENTIA WITHOUT BEHAVIORAL DISTURBANCE (HCC): Primary | ICD-10-CM

## 2025-07-17 DIAGNOSIS — N13.8 BPH WITH OBSTRUCTION/LOWER URINARY TRACT SYMPTOMS: ICD-10-CM

## 2025-07-17 DIAGNOSIS — M54.50 CHRONIC LOW BACK PAIN, UNSPECIFIED BACK PAIN LATERALITY, UNSPECIFIED WHETHER SCIATICA PRESENT: ICD-10-CM

## 2025-07-17 DIAGNOSIS — G89.29 CHRONIC LOW BACK PAIN, UNSPECIFIED BACK PAIN LATERALITY, UNSPECIFIED WHETHER SCIATICA PRESENT: ICD-10-CM

## 2025-07-17 DIAGNOSIS — F51.01 PRIMARY INSOMNIA: ICD-10-CM

## 2025-07-17 DIAGNOSIS — N40.1 BPH WITH OBSTRUCTION/LOWER URINARY TRACT SYMPTOMS: ICD-10-CM

## 2025-07-17 DIAGNOSIS — G30.1 LATE ONSET ALZHEIMER'S DEMENTIA WITHOUT BEHAVIORAL DISTURBANCE (HCC): Primary | ICD-10-CM

## 2025-07-17 DIAGNOSIS — K59.04 CHRONIC IDIOPATHIC CONSTIPATION: ICD-10-CM

## 2025-07-17 PROCEDURE — 99309 SBSQ NF CARE MODERATE MDM 30: CPT | Performed by: INTERNAL MEDICINE

## 2025-07-17 NOTE — ASSESSMENT & PLAN NOTE
Seems stable  Continue with tylenol and lidocaine patch  Continue monitoring symptoms  Continue with activity as tolerable

## 2025-07-17 NOTE — PROGRESS NOTES
[facility] notes  LONG TERM CARE       NAME: Roque Adhikari  AGE: 85 y.o. SEX: male    DATE OF ENCOUNTER: 7/17/2025    Assessment and Plan   Late onset Alzheimer's dementia without behavioral disturbance (HCC)  Continues to slowly decline  Continue MVI, vitamin D and vitamin B12   Continues to require help with ADLs  Continue with safety measures  Continue with fall precautions  Continue to encourage fluids and oral intake       BPH with obstruction/lower urinary tract symptoms  Symptoms seems stable  Continue with alfuzosin   Continue monitoring symptoms     Chronic idiopathic constipation  Seems stable  Continue with miralax and senna S  Continue with bowel protocol     Low back pain  Seems stable  Continue with tylenol and lidocaine patch  Continue monitoring symptoms  Continue with activity as tolerable     Primary insomnia  Seems stable  No reported issues  Continue with melatonin  Continue monitoring symptoms         Chief Complaint     No new complaints     History of Present Illness     86 yo male seen for monthly visit and med renewal. Patient seen for dementia, BPH, constipation, chronic low back pain and insomnia. Reviewed nursing notes since last visit.     PMHx     Past Medical History[1]  Past Surgical History[2]  Family History[3]  Social History[4]  Allergies[5]    Review of Systems     Denies any pain or shortness of breath   All other review of system negative but hx limited due to dementia       Objective   Vital signs reviewed from facility records.     PHYSICAL EXAM:  GENERAL: no acute distress  SKIN: warm, dry, no rash, no cyanosis  HEENT: normocephalic, atraumatic, no JVD, no Thyromegaly, no lymphadenopathy  LUNGS: CTA, no wheezing, no rales, expanded equally, no chest tenderness   HEART: normal rhythm, normal rate, no murmur, no gallop  ABDOMEN: soft non tender non distended bs+, no guarding or rebound tenderness  :  no suprapubic tenderness  MUSCULOSKELETAL: strength  about 4+/5 all extremities, no calf tenderness  NEUROLOGY: awake, alert, CN2-12 intact.   PSYCH: cooperative, pleasant.       Pertinent Laboratory/Diagnostic Studies:  Recent labs and diagnostic tests reviewed in nursing home EMR    Current Medications   Medications reviewed and signed off on nursing home EMR.  Prescriptions drug management - Patient prescriptions sent to pharmacy via renewal in [facility]      OZZIE PICKETT MD         [1]   Past Medical History:  Diagnosis Date    Acute prostatitis     Arthritis     BPH with obstruction/lower urinary tract symptoms     Cervicalgia     Dementia (HCC)     Dysuria     Erectile dysfunction     Feeling of incomplete bladder emptying     Frequency of urination     Nocturia     Urinary retention    [2]   Past Surgical History:  Procedure Laterality Date    CATARACT EXTRACTION      HERNIA REPAIR  1998    HIP SURGERY  2019   [3]   Family History  Problem Relation Name Age of Onset    Pneumonia Mother      Heart attack Father     [4]   Social History  Socioeconomic History    Marital status: /Civil Union   Tobacco Use    Smoking status: Former     Current packs/day: 0.00     Types: Cigarettes     Quit date: 1972     Years since quittin.5    Smokeless tobacco: Never   Vaping Use    Vaping status: Never Used   Substance and Sexual Activity    Alcohol use: Yes    Drug use: No     Social Drivers of Health     Financial Resource Strain: Patient Declined (7/3/2024)    Received from [facility]    Overall Financial Resource Strain (CARDIA)     Difficulty of Paying Living Expenses: Patient declined   Food Insecurity: No Food Insecurity (7/3/2024)    Received from [facility]    Hunger Vital Sign     Worried About Running Out of Food in the Last Year: Never true     Ran Out of Food in the Last Year: Never true   Transportation Needs: No Transportation Needs (7/3/2024)    Received from [facility]    PRAPARE - Transportation      Lack of Transportation (Medical): No     Lack of Transportation (Non-Medical): No   Intimate Partner Violence: Patient Declined (7/3/2024)    Received from [facility]    Humiliation, Afraid, Rape, and Kick questionnaire     Fear of Current or Ex-Partner: Patient declined     Emotionally Abused: Patient declined     Physically Abused: Patient declined     Sexually Abused: Patient declined   Housing Stability: Low Risk  (7/3/2024)    Received from [facility]    Housing Stability Vital Sign     Unable to Pay for Housing in the Last Year: No     Number of Times Moved in the Last Year: 1     Homeless in the Last Year: No   [5] No Known Allergies

## 2025-08-13 ENCOUNTER — NURSING HOME VISIT (OUTPATIENT)
Dept: GERIATRICS | Facility: OTHER | Age: 85
End: 2025-08-13
Payer: MEDICARE